# Patient Record
Sex: MALE | Race: BLACK OR AFRICAN AMERICAN | NOT HISPANIC OR LATINO
[De-identification: names, ages, dates, MRNs, and addresses within clinical notes are randomized per-mention and may not be internally consistent; named-entity substitution may affect disease eponyms.]

---

## 2017-09-10 ENCOUNTER — TRANSCRIPTION ENCOUNTER (OUTPATIENT)
Age: 57
End: 2017-09-10

## 2020-03-02 ENCOUNTER — INPATIENT (INPATIENT)
Facility: HOSPITAL | Age: 60
LOS: 6 days | Discharge: HOME | DRG: 286 | End: 2020-03-09
Attending: HOSPITALIST | Admitting: FAMILY MEDICINE
Payer: COMMERCIAL

## 2020-03-02 VITALS
WEIGHT: 197.98 LBS | RESPIRATION RATE: 28 BRPM | HEART RATE: 91 BPM | HEIGHT: 73 IN | DIASTOLIC BLOOD PRESSURE: 85 MMHG | TEMPERATURE: 98 F | SYSTOLIC BLOOD PRESSURE: 163 MMHG | OXYGEN SATURATION: 98 %

## 2020-03-02 DIAGNOSIS — R06.03 ACUTE RESPIRATORY DISTRESS: ICD-10-CM

## 2020-03-02 LAB
ALBUMIN SERPL ELPH-MCNC: 4.1 G/DL — SIGNIFICANT CHANGE UP (ref 3.3–5.2)
ALP SERPL-CCNC: 93 U/L — SIGNIFICANT CHANGE UP (ref 40–120)
ALT FLD-CCNC: 156 U/L — HIGH
ANION GAP SERPL CALC-SCNC: 16 MMOL/L — SIGNIFICANT CHANGE UP (ref 5–17)
APTT BLD: 26.4 SEC — LOW (ref 27.5–36.3)
AST SERPL-CCNC: 98 U/L — HIGH
BILIRUB SERPL-MCNC: 0.9 MG/DL — SIGNIFICANT CHANGE UP (ref 0.4–2)
BUN SERPL-MCNC: 19 MG/DL — SIGNIFICANT CHANGE UP (ref 8–20)
CALCIUM SERPL-MCNC: 8.9 MG/DL — SIGNIFICANT CHANGE UP (ref 8.6–10.2)
CHLORIDE SERPL-SCNC: 103 MMOL/L — SIGNIFICANT CHANGE UP (ref 98–107)
CO2 SERPL-SCNC: 21 MMOL/L — LOW (ref 22–29)
CREAT SERPL-MCNC: 1.11 MG/DL — SIGNIFICANT CHANGE UP (ref 0.5–1.3)
FLU A RESULT: SIGNIFICANT CHANGE UP
FLU A RESULT: SIGNIFICANT CHANGE UP
FLUAV AG NPH QL: SIGNIFICANT CHANGE UP
FLUBV AG NPH QL: SIGNIFICANT CHANGE UP
GLUCOSE BLDC GLUCOMTR-MCNC: 163 MG/DL — HIGH (ref 70–99)
GLUCOSE SERPL-MCNC: 182 MG/DL — HIGH (ref 70–99)
HCT VFR BLD CALC: 46.6 % — SIGNIFICANT CHANGE UP (ref 39–50)
HGB BLD-MCNC: 15.2 G/DL — SIGNIFICANT CHANGE UP (ref 13–17)
INR BLD: 1.09 RATIO — SIGNIFICANT CHANGE UP (ref 0.88–1.16)
MAGNESIUM SERPL-MCNC: 1.8 MG/DL — SIGNIFICANT CHANGE UP (ref 1.6–2.6)
MCHC RBC-ENTMCNC: 29.2 PG — SIGNIFICANT CHANGE UP (ref 27–34)
MCHC RBC-ENTMCNC: 32.6 GM/DL — SIGNIFICANT CHANGE UP (ref 32–36)
MCV RBC AUTO: 89.6 FL — SIGNIFICANT CHANGE UP (ref 80–100)
NT-PROBNP SERPL-SCNC: 260 PG/ML — SIGNIFICANT CHANGE UP (ref 0–300)
PLATELET # BLD AUTO: 239 K/UL — SIGNIFICANT CHANGE UP (ref 150–400)
POTASSIUM SERPL-MCNC: 4.1 MMOL/L — SIGNIFICANT CHANGE UP (ref 3.5–5.3)
POTASSIUM SERPL-SCNC: 4.1 MMOL/L — SIGNIFICANT CHANGE UP (ref 3.5–5.3)
PROT SERPL-MCNC: 7.1 G/DL — SIGNIFICANT CHANGE UP (ref 6.6–8.7)
PROTHROM AB SERPL-ACNC: 12.3 SEC — SIGNIFICANT CHANGE UP (ref 10–12.9)
RBC # BLD: 5.2 M/UL — SIGNIFICANT CHANGE UP (ref 4.2–5.8)
RBC # FLD: 13.5 % — SIGNIFICANT CHANGE UP (ref 10.3–14.5)
RSV RESULT: SIGNIFICANT CHANGE UP
RSV RNA RESP QL NAA+PROBE: SIGNIFICANT CHANGE UP
SODIUM SERPL-SCNC: 140 MMOL/L — SIGNIFICANT CHANGE UP (ref 135–145)
TROPONIN T SERPL-MCNC: <0.01 NG/ML — SIGNIFICANT CHANGE UP (ref 0–0.06)
WBC # BLD: 8.6 K/UL — SIGNIFICANT CHANGE UP (ref 3.8–10.5)
WBC # FLD AUTO: 8.6 K/UL — SIGNIFICANT CHANGE UP (ref 3.8–10.5)

## 2020-03-02 PROCEDURE — 99291 CRITICAL CARE FIRST HOUR: CPT

## 2020-03-02 PROCEDURE — 93010 ELECTROCARDIOGRAM REPORT: CPT

## 2020-03-02 PROCEDURE — 71045 X-RAY EXAM CHEST 1 VIEW: CPT | Mod: 26

## 2020-03-02 RX ORDER — IPRATROPIUM/ALBUTEROL SULFATE 18-103MCG
3 AEROSOL WITH ADAPTER (GRAM) INHALATION EVERY 6 HOURS
Refills: 0 | Status: DISCONTINUED | OUTPATIENT
Start: 2020-03-02 | End: 2020-03-05

## 2020-03-02 RX ORDER — SODIUM CHLORIDE 9 MG/ML
1000 INJECTION, SOLUTION INTRAVENOUS
Refills: 0 | Status: DISCONTINUED | OUTPATIENT
Start: 2020-03-02 | End: 2020-03-09

## 2020-03-02 RX ORDER — ALBUTEROL 90 UG/1
1 AEROSOL, METERED ORAL EVERY 4 HOURS
Refills: 0 | Status: DISCONTINUED | OUTPATIENT
Start: 2020-03-02 | End: 2020-03-09

## 2020-03-02 RX ORDER — INSULIN GLARGINE 100 [IU]/ML
10 INJECTION, SOLUTION SUBCUTANEOUS AT BEDTIME
Refills: 0 | Status: DISCONTINUED | OUTPATIENT
Start: 2020-03-02 | End: 2020-03-07

## 2020-03-02 RX ORDER — IPRATROPIUM/ALBUTEROL SULFATE 18-103MCG
3 AEROSOL WITH ADAPTER (GRAM) INHALATION ONCE
Refills: 0 | Status: COMPLETED | OUTPATIENT
Start: 2020-03-02 | End: 2020-03-02

## 2020-03-02 RX ORDER — ASPIRIN/CALCIUM CARB/MAGNESIUM 324 MG
325 TABLET ORAL ONCE
Refills: 0 | Status: COMPLETED | OUTPATIENT
Start: 2020-03-02 | End: 2020-03-02

## 2020-03-02 RX ORDER — FUROSEMIDE 40 MG
20 TABLET ORAL DAILY
Refills: 0 | Status: DISCONTINUED | OUTPATIENT
Start: 2020-03-03 | End: 2020-03-03

## 2020-03-02 RX ORDER — ENOXAPARIN SODIUM 100 MG/ML
40 INJECTION SUBCUTANEOUS DAILY
Refills: 0 | Status: DISCONTINUED | OUTPATIENT
Start: 2020-03-02 | End: 2020-03-05

## 2020-03-02 RX ORDER — INSULIN LISPRO 100/ML
VIAL (ML) SUBCUTANEOUS
Refills: 0 | Status: DISCONTINUED | OUTPATIENT
Start: 2020-03-02 | End: 2020-03-09

## 2020-03-02 RX ORDER — TIOTROPIUM BROMIDE 18 UG/1
1 CAPSULE ORAL; RESPIRATORY (INHALATION) DAILY
Refills: 0 | Status: DISCONTINUED | OUTPATIENT
Start: 2020-03-02 | End: 2020-03-09

## 2020-03-02 RX ORDER — DEXTROSE 50 % IN WATER 50 %
25 SYRINGE (ML) INTRAVENOUS ONCE
Refills: 0 | Status: DISCONTINUED | OUTPATIENT
Start: 2020-03-02 | End: 2020-03-09

## 2020-03-02 RX ORDER — DEXTROSE 50 % IN WATER 50 %
12.5 SYRINGE (ML) INTRAVENOUS ONCE
Refills: 0 | Status: DISCONTINUED | OUTPATIENT
Start: 2020-03-02 | End: 2020-03-09

## 2020-03-02 RX ORDER — ALBUTEROL 90 UG/1
2.5 AEROSOL, METERED ORAL
Refills: 0 | Status: DISCONTINUED | OUTPATIENT
Start: 2020-03-02 | End: 2020-03-09

## 2020-03-02 RX ORDER — FUROSEMIDE 40 MG
40 TABLET ORAL ONCE
Refills: 0 | Status: COMPLETED | OUTPATIENT
Start: 2020-03-02 | End: 2020-03-02

## 2020-03-02 RX ORDER — LISINOPRIL 2.5 MG/1
10 TABLET ORAL DAILY
Refills: 0 | Status: DISCONTINUED | OUTPATIENT
Start: 2020-03-02 | End: 2020-03-08

## 2020-03-02 RX ORDER — GLUCAGON INJECTION, SOLUTION 0.5 MG/.1ML
1 INJECTION, SOLUTION SUBCUTANEOUS ONCE
Refills: 0 | Status: DISCONTINUED | OUTPATIENT
Start: 2020-03-02 | End: 2020-03-09

## 2020-03-02 RX ORDER — ASPIRIN/CALCIUM CARB/MAGNESIUM 324 MG
81 TABLET ORAL DAILY
Refills: 0 | Status: DISCONTINUED | OUTPATIENT
Start: 2020-03-03 | End: 2020-03-09

## 2020-03-02 RX ORDER — DEXTROSE 50 % IN WATER 50 %
15 SYRINGE (ML) INTRAVENOUS ONCE
Refills: 0 | Status: DISCONTINUED | OUTPATIENT
Start: 2020-03-02 | End: 2020-03-09

## 2020-03-02 RX ORDER — INSULIN LISPRO 100/ML
VIAL (ML) SUBCUTANEOUS AT BEDTIME
Refills: 0 | Status: DISCONTINUED | OUTPATIENT
Start: 2020-03-02 | End: 2020-03-09

## 2020-03-02 RX ORDER — NITROGLYCERIN 6.5 MG
1 CAPSULE, EXTENDED RELEASE ORAL ONCE
Refills: 0 | Status: COMPLETED | OUTPATIENT
Start: 2020-03-02 | End: 2020-03-02

## 2020-03-02 RX ADMIN — INSULIN GLARGINE 10 UNIT(S): 100 INJECTION, SOLUTION SUBCUTANEOUS at 21:47

## 2020-03-02 RX ADMIN — LISINOPRIL 10 MILLIGRAM(S): 2.5 TABLET ORAL at 21:46

## 2020-03-02 RX ADMIN — Medication 3 MILLILITER(S): at 15:58

## 2020-03-02 RX ADMIN — Medication 1 INCH(S): at 15:57

## 2020-03-02 RX ADMIN — Medication 40 MILLIGRAM(S): at 21:46

## 2020-03-02 RX ADMIN — Medication 40 MILLIGRAM(S): at 15:57

## 2020-03-02 RX ADMIN — ENOXAPARIN SODIUM 40 MILLIGRAM(S): 100 INJECTION SUBCUTANEOUS at 21:47

## 2020-03-02 RX ADMIN — Medication 3 MILLILITER(S): at 20:16

## 2020-03-02 NOTE — ED PROVIDER NOTE - OBJECTIVE STATEMENT
59M w/ pmh 59M w/ pmh HTN, HLD -- p/w 59M w/ pmh HTN, HLD -- p/w sob x several days, worse today, assc w/ mild lightheadedness. No fever, n/v/d/c, chest / abd pain, cough, dysuria/hematuria. No recent travel, medication change, illness, or hospitalization. no h/o asthma, inhaler use. former smoker when younger.    Last stress 1 yr ago, longer since last echo.

## 2020-03-02 NOTE — ED ADULT NURSE REASSESSMENT NOTE - NS ED NURSE REASSESS COMMENT FT1
Report received from day RN Pt A&Ox4 at this time. Bipap removed prior and changed to NC @ 2L prior to this RN assuming care of patient. Patient tolerating NC well. breathing unlabored. denies sob or chest pain. lung sounds clear through out. Pt resting comfortably, VSS, no signs of distress at this time, CM in place, awaiting bed for admission. safety maintained, call bell in reach.

## 2020-03-02 NOTE — H&P ADULT - ASSESSMENT
pt. is admitted for     - Dyspnea unspecified type, likely related to his wheeze, bronchitis ? no h/o asthma, clinically not in chf or volume over loaded, respiratory viral panel pending. continue duoneb, o2 support, iv solumedrol. will get echo.     - Essential htn, continue lisinopril and clonidine.     - DM type 2 no long term insulin use with hyperglycemia, will keep on humalog scale and lantus as he will be on steroids at this point.    - JADON , continue pt's cpap.     - ALT/ ast/ elevation noted, no old labs available for comparison, will follow trend and hepatitis panel ordered.

## 2020-03-02 NOTE — ED ADULT NURSE NOTE - OBJECTIVE STATEMENT
pt c/o SOB that started this morning. pt states he would occasionally experience SOB but it would usually go away. however, today the SOB worsened. pt also c/o new onset dry cough. pt denies any chest pain at the moment. pt is a&o x4, breathing is labored.

## 2020-03-02 NOTE — ED ADULT NURSE REASSESSMENT NOTE - NS ED NURSE REASSESS COMMENT FT1
Report received from day RN Pt A&Ox4 at this time. Heparin drip running at 11ml/hr as ordered. denies chest pain or sob at this time. breathing unlabored.  Pt resting comfortably, VSS, no signs of distress at this time, CM in place, Brother at bedside. awaiting bed for admission. safety maintained, call bell in reach.

## 2020-03-02 NOTE — ED PROVIDER NOTE - ATTENDING CONTRIBUTION TO CARE
The patient seen and examined    Acute respiratory distress  COPD vs CHF    I, Ivan De Luna, performed the initial face to face bedside interview with this patient regarding history of present illness, review of symptoms and relevant past medical, social and family history.  I completed an independent physical examination.  I was the initial provider who evaluated this patient. I have signed out the follow up of any pending tests (i.e. labs, radiological studies) to the resident.  I have communicated the patient’s plan of care and disposition with the resident.

## 2020-03-02 NOTE — H&P ADULT - HISTORY OF PRESENT ILLNESS
60 y/o male with h/o htn, dm and sleep apnea and on cpap of 14 as per pt. came to the ER as he started having dry cough sob with cough ,  wheeze in the chest which he experienced for short period yesterday, got better on its own but all symptoms came back this morning and symptoms were way more that yesterday so he decided to come to the hospital. no sick contact. no recent travel. no cp. no fever. no abd. pain. no n/v/d. Upon arival in the ER pt. was put on bipap, iv solumedrol ( was wheezing as per ER physician dr. De Luna ) , iv lasix and at the time of admission pt. feels better, came off the bipap and on o2 via NC and talking in full sentences. 58 y/o male with h/o htn, dm and sleep apnea and on cpap of 14 as per pt. came to the ER as he started having dry cough sob with cough ,  wheeze in the chest which he experienced for short period yesterday, got better on its own but all symptoms came back this morning and symptoms were way more that yesterday so he decided to come to the hospital. no sick contact. no recent travel. no cp. no fever. no abd. pain. no n/v/d. Upon arival in the ER pt. was put on bipap, iv solumedrol ( was wheezing as per ER physician dr. De Luna ) , iv lasix and at the time of admission pt. feels better, came off the bipap and on o2 via NC and talking in full sentences.

## 2020-03-02 NOTE — ED PROVIDER NOTE - PHYSICAL EXAMINATION
*GEN:   mildly uncomfortable, AOx3  *EYES:   pupils equally round and reactive to light, extra-occular movements intact  *HEENT:   airway patent, moist mucosal membranes, full ROM neck  *CV:   regular rate and rhythm  *RESP:   distant breath sounds and mild wheeze bilat  *ABD:   soft, non-tender  *:   no cva/flank tenderness  *EXTREM:   no MSK tenderness, full ROM throughout, no leg swelling  *SKIN:   dry, intact  *NEURO:   AOx3, no focal weakness or loss of sensation

## 2020-03-02 NOTE — H&P ADULT - NSHPPHYSICALEXAM_GEN_ALL_CORE
General: Well developed AA male lying in bed not in distress.   HEENT: AT, NC. PERRL. intact EOM. no throat erythema or exudate.   Neck: supple. no JVD.   Chest:  mild wheeze bilaterally, no rales, no inter costal retractions.   Heart: S1,S2. RRR. no heart murmur. no edema.   Abdomen: soft. non-tender. obese,+ BS.   Ext:  no calf tenderness. ROM of all ext. intact, distal pulses intact.   Neuro: AAO x3. no focal weakness. no speech disorder. CNs intcat. m/r/s intact.  Skin: no rash noted, warm and dry.  psych : normal affect.

## 2020-03-02 NOTE — ED ADULT TRIAGE NOTE - CHIEF COMPLAINT QUOTE
Patient A&Ox4, diaphoretic, complaining of shortness of breath that began last night, worsening throughout the day. Denies any chest pain nausea or dizziness. Patient arrived with CPAP in place by EMS, well tolerated. Respirations labored, Dr. De Luna & RT at bedside, patient placed on BiPap

## 2020-03-02 NOTE — ED PROVIDER NOTE - CLINICAL SUMMARY MEDICAL DECISION MAKING FREE TEXT BOX
59M w/ sob, concern for new CHF vs copd, pna, asthma - will check cardiacs as well, place on bipap, reassess

## 2020-03-03 LAB
ALBUMIN SERPL ELPH-MCNC: 4 G/DL — SIGNIFICANT CHANGE UP (ref 3.3–5.2)
ALP SERPL-CCNC: 72 U/L — SIGNIFICANT CHANGE UP (ref 40–120)
ALT FLD-CCNC: 121 U/L — HIGH
ANION GAP SERPL CALC-SCNC: 13 MMOL/L — SIGNIFICANT CHANGE UP (ref 5–17)
AST SERPL-CCNC: 44 U/L — HIGH
BASOPHILS # BLD AUTO: 0.01 K/UL — SIGNIFICANT CHANGE UP (ref 0–0.2)
BASOPHILS NFR BLD AUTO: 0.1 % — SIGNIFICANT CHANGE UP (ref 0–2)
BILIRUB SERPL-MCNC: 1.1 MG/DL — SIGNIFICANT CHANGE UP (ref 0.4–2)
BUN SERPL-MCNC: 19 MG/DL — SIGNIFICANT CHANGE UP (ref 8–20)
CALCIUM SERPL-MCNC: 9.1 MG/DL — SIGNIFICANT CHANGE UP (ref 8.6–10.2)
CHLORIDE SERPL-SCNC: 103 MMOL/L — SIGNIFICANT CHANGE UP (ref 98–107)
CHOLEST SERPL-MCNC: 118 MG/DL — SIGNIFICANT CHANGE UP (ref 110–199)
CO2 SERPL-SCNC: 24 MMOL/L — SIGNIFICANT CHANGE UP (ref 22–29)
CREAT SERPL-MCNC: 1.14 MG/DL — SIGNIFICANT CHANGE UP (ref 0.5–1.3)
EOSINOPHIL # BLD AUTO: 0 K/UL — SIGNIFICANT CHANGE UP (ref 0–0.5)
EOSINOPHIL NFR BLD AUTO: 0 % — SIGNIFICANT CHANGE UP (ref 0–6)
GLUCOSE BLDC GLUCOMTR-MCNC: 195 MG/DL — HIGH (ref 70–99)
GLUCOSE BLDC GLUCOMTR-MCNC: 221 MG/DL — HIGH (ref 70–99)
GLUCOSE BLDC GLUCOMTR-MCNC: 226 MG/DL — HIGH (ref 70–99)
GLUCOSE BLDC GLUCOMTR-MCNC: 274 MG/DL — HIGH (ref 70–99)
GLUCOSE SERPL-MCNC: 215 MG/DL — HIGH (ref 70–99)
HAV IGM SER-ACNC: SIGNIFICANT CHANGE UP
HBA1C BLD-MCNC: 8.6 % — HIGH (ref 4–5.6)
HBV CORE IGM SER-ACNC: SIGNIFICANT CHANGE UP
HBV SURFACE AG SER-ACNC: SIGNIFICANT CHANGE UP
HCT VFR BLD CALC: 44.6 % — SIGNIFICANT CHANGE UP (ref 39–50)
HCV AB S/CO SERPL IA: 0.28 S/CO — SIGNIFICANT CHANGE UP (ref 0–0.99)
HCV AB SERPL-IMP: SIGNIFICANT CHANGE UP
HDLC SERPL-MCNC: 49 MG/DL — SIGNIFICANT CHANGE UP
HGB BLD-MCNC: 14.5 G/DL — SIGNIFICANT CHANGE UP (ref 13–17)
IMM GRANULOCYTES NFR BLD AUTO: 0.4 % — SIGNIFICANT CHANGE UP (ref 0–1.5)
LIPID PNL WITH DIRECT LDL SERPL: 49 MG/DL — SIGNIFICANT CHANGE UP
LYMPHOCYTES # BLD AUTO: 0.99 K/UL — LOW (ref 1–3.3)
LYMPHOCYTES # BLD AUTO: 12.2 % — LOW (ref 13–44)
MCHC RBC-ENTMCNC: 29.1 PG — SIGNIFICANT CHANGE UP (ref 27–34)
MCHC RBC-ENTMCNC: 32.5 GM/DL — SIGNIFICANT CHANGE UP (ref 32–36)
MCV RBC AUTO: 89.4 FL — SIGNIFICANT CHANGE UP (ref 80–100)
MONOCYTES # BLD AUTO: 0.16 K/UL — SIGNIFICANT CHANGE UP (ref 0–0.9)
MONOCYTES NFR BLD AUTO: 2 % — SIGNIFICANT CHANGE UP (ref 2–14)
NEUTROPHILS # BLD AUTO: 6.94 K/UL — SIGNIFICANT CHANGE UP (ref 1.8–7.4)
NEUTROPHILS NFR BLD AUTO: 85.3 % — HIGH (ref 43–77)
PLATELET # BLD AUTO: 237 K/UL — SIGNIFICANT CHANGE UP (ref 150–400)
POTASSIUM SERPL-MCNC: 4.6 MMOL/L — SIGNIFICANT CHANGE UP (ref 3.5–5.3)
POTASSIUM SERPL-SCNC: 4.6 MMOL/L — SIGNIFICANT CHANGE UP (ref 3.5–5.3)
PROT SERPL-MCNC: 6.9 G/DL — SIGNIFICANT CHANGE UP (ref 6.6–8.7)
RBC # BLD: 4.99 M/UL — SIGNIFICANT CHANGE UP (ref 4.2–5.8)
RBC # FLD: 13.7 % — SIGNIFICANT CHANGE UP (ref 10.3–14.5)
SODIUM SERPL-SCNC: 140 MMOL/L — SIGNIFICANT CHANGE UP (ref 135–145)
TOTAL CHOLESTEROL/HDL RATIO MEASUREMENT: 2 RATIO — LOW (ref 3.4–9.6)
TRIGL SERPL-MCNC: 99 MG/DL — SIGNIFICANT CHANGE UP (ref 10–200)
TROPONIN T SERPL-MCNC: <0.01 NG/ML — SIGNIFICANT CHANGE UP (ref 0–0.06)
WBC # BLD: 8.13 K/UL — SIGNIFICANT CHANGE UP (ref 3.8–10.5)
WBC # FLD AUTO: 8.13 K/UL — SIGNIFICANT CHANGE UP (ref 3.8–10.5)

## 2020-03-03 PROCEDURE — 99233 SBSQ HOSP IP/OBS HIGH 50: CPT

## 2020-03-03 PROCEDURE — 93306 TTE W/DOPPLER COMPLETE: CPT | Mod: 26

## 2020-03-03 RX ORDER — INFLUENZA VIRUS VACCINE 15; 15; 15; 15 UG/.5ML; UG/.5ML; UG/.5ML; UG/.5ML
0.5 SUSPENSION INTRAMUSCULAR ONCE
Refills: 0 | Status: DISCONTINUED | OUTPATIENT
Start: 2020-03-03 | End: 2020-03-09

## 2020-03-03 RX ORDER — INSULIN LISPRO 100/ML
4 VIAL (ML) SUBCUTANEOUS
Refills: 0 | Status: DISCONTINUED | OUTPATIENT
Start: 2020-03-03 | End: 2020-03-09

## 2020-03-03 RX ORDER — FUROSEMIDE 40 MG
40 TABLET ORAL ONCE
Refills: 0 | Status: COMPLETED | OUTPATIENT
Start: 2020-03-03 | End: 2020-03-03

## 2020-03-03 RX ADMIN — Medication 40 MILLIGRAM(S): at 05:12

## 2020-03-03 RX ADMIN — Medication 3 MILLILITER(S): at 03:23

## 2020-03-03 RX ADMIN — Medication 2: at 15:53

## 2020-03-03 RX ADMIN — INSULIN GLARGINE 10 UNIT(S): 100 INJECTION, SOLUTION SUBCUTANEOUS at 22:40

## 2020-03-03 RX ADMIN — Medication 40 MILLIGRAM(S): at 17:28

## 2020-03-03 RX ADMIN — Medication 0.1 MILLIGRAM(S): at 05:12

## 2020-03-03 RX ADMIN — Medication 4: at 11:47

## 2020-03-03 RX ADMIN — Medication 3 MILLILITER(S): at 21:46

## 2020-03-03 RX ADMIN — Medication 3 MILLILITER(S): at 08:29

## 2020-03-03 RX ADMIN — Medication 3 MILLILITER(S): at 14:56

## 2020-03-03 RX ADMIN — ENOXAPARIN SODIUM 40 MILLIGRAM(S): 100 INJECTION SUBCUTANEOUS at 11:48

## 2020-03-03 RX ADMIN — Medication 20 MILLIGRAM(S): at 05:12

## 2020-03-03 RX ADMIN — Medication 40 MILLIGRAM(S): at 17:29

## 2020-03-03 RX ADMIN — Medication 40 MILLIGRAM(S): at 15:53

## 2020-03-03 RX ADMIN — Medication 81 MILLIGRAM(S): at 11:48

## 2020-03-03 RX ADMIN — Medication 2: at 22:40

## 2020-03-03 RX ADMIN — Medication 4: at 08:05

## 2020-03-03 RX ADMIN — Medication 1 INCH(S): at 04:23

## 2020-03-03 NOTE — CONSULT NOTE ADULT - SUBJECTIVE AND OBJECTIVE BOX
[place] [facility]                                                               Office:  [address] [address]                                                              Telephone: [phone]. Fax:[phone]                                                                        CARDIOLOGY CONSULTATION NOTE                                                                                             Consult requested by:  Dr. Suarez  Reason for Consultation:  Cardiomyopathy  History obtained by: Patient and medical record   obtained: No    Chief complaint:    Patient is a 59y old  Male who presents with a chief complaint of cough/ wheeze (03 Mar 2020 16:30)        HPI: Pt is a 58 y/o male with medical history of sleep apnea on CPAP, HTN, HLD, who presents to [facility]ED for SOB. Pt states that since Monday 5:30 AM he has been   58 y/o male with h/o htn, dm and sleep apnea and on cpap of 14 as per pt. came to the ER as he started having dry cough sob with cough ,  wheeze in the chest which he experienced for short period yesterday, got better on its own but all symptoms came back this morning and symptoms were way more that yesterday so he decided to come to the hospital. no sick contact. no recent travel. no cp. no fever. no abd. pain. no n/v/d. Upon arival in the ER pt. was put on bipap, iv solumedrol ( was wheezing as per ER physician dr. De Luna ) , iv lasix and at the time of admission pt. feels better, came off the bipap and on o2 via NC and talking in full sentences. (02 Mar 2020 18:26)        REVIEW OF SYMPTOMS:     CONSTITUTIONAL: No fever, weight loss, or fatigue  ENMT:  No difficulty hearing, tinnitus, vertigo; No sinus or throat pain  NECK: No pain or stiffness  CARDIOVASCULAR: No chest pain, dyspnea, syncope, palpitations, dizziness, Orthopnea, Paroxsymal nocturnal dyspnea  RESPIRATORY: No Dyspnea on exertion, Shortness of breath, cough, wheezing  : No dysuria, no hematuria   GI: No dark color stool, no melena, no diarrhea, no constipation, no abdominal pain   NEURO: No headache, no dizziness, no slurred speech   MUSCULOSKELETAL: No joint pain or swelling; No muscle, back, or extremity pain  PSYCH: No agitation, no anxiety.    ALL OTHER REVIEW OF SYSTEMS ARE NEGATIVE.      PREVIOUS DIAGNOSTIC TESTING  ECHO FINDINGS:      STRESS FINDINGS:      CATHETERIZATION FINDINGS:         ALLERGIES: Allergies    No Known Allergies    Intolerances          PAST MEDICAL HISTORY  Obstructive sleep apnea  DM (diabetes mellitus)  HTN (hypertension)      PAST SURGICAL HISTORY  No significant past surgical history      FAMILY HISTORY:  Family history of diabetes mellitus (DM): parents      SOCIAL HISTORY:  Denies smoking/alcohol/drugs  CIGARETTES:     ALCOHOL:  DRUGS:      CURRENT MEDICATIONS:  cloNIDine 0.1 milliGRAM(s) Oral daily  lisinopril 10 milliGRAM(s) Oral daily    ALBUTerol    90 MICROgram(s) HFA Inhaler  albuterol/ipratropium for Nebulization  tiotropium 18 MICROgram(s) Capsule   aspirin enteric coated  dextrose 5%.  dextrose 50% Injectable  dextrose 50% Injectable  dextrose 50% Injectable  enoxaparin Injectable  influenza   Vaccine  insulin glargine Injectable (LANTUS)  insulin lispro (HumaLOG) corrective regimen sliding scale  insulin lispro (HumaLOG) corrective regimen sliding scale  insulin lispro Injectable (HumaLOG)  methylPREDNISolone sodium succinate Injectable        HOME MEDICATIONS:      Vital Signs Last 24 Hrs  T(C): 36.7 (03 Mar 2020 15:34), Max: 36.9 (02 Mar 2020 19:51)  T(F): 98.1 (03 Mar 2020 15:34), Max: 98.5 (03 Mar 2020 03:53)  HR: 87 (03 Mar 2020 15:34) (68 - 88)  BP: 147/90 (03 Mar 2020 15:34) (118/78 - 164/88)  BP(mean): --  RR: 20 (03 Mar 2020 15:34) (18 - 20)  SpO2: 99% (03 Mar 2020 15:34) (97% - 99%)      PHYSICAL EXAM:  Constitutional: Comfortable . No acute distress.   HEENT: Atraumatic and normocephalic , neck is supple . no JVD. No carotid bruit. PEERL   CNS: A&Ox3. No focal deficits. EOMI. Cranial nerves II-IX are intact.   Lymph Nodes: Cervical : Not palpable.  Respiratory: CTAB  Cardiovascular: S1S2 RRR. No murmur/rubs or gallop.  Gastrointestinal: Soft non-tender and non distended . +Bowel sounds. negative Saxena's sign.  Extremities: No edema.   Psychiatric: Calm . no agitation.  Skin: No skin rash/ulcers visualized to face, hands or feet.    Intake and output:     LABS:                        14.5   8.13  )-----------( 237      ( 03 Mar 2020 07:24 )             44.6     03-03    140  |  103  |  19.0  ----------------------------<  215<H>  4.6   |  24.0  |  1.14    Ca    9.1      03 Mar 2020 07:24  Mg     1.8     03-02    TPro  6.9  /  Alb  4.0  /  TBili  1.1  /  DBili  x   /  AST  44<H>  /  ALT  121<H>  /  AlkPhos  72  03-03    CARDIAC MARKERS ( 03 Mar 2020 07:23 )  x     / <0.01 ng/mL / x     / x     / x      CARDIAC MARKERS ( 02 Mar 2020 16:26 )  x     / <0.01 ng/mL / x     / x     / x        ;p-BNP=Serum Pro-Brain Natriuretic Peptide: 260 pg/mL (03-02 @ 16:26)    PT/INR - ( 02 Mar 2020 19:34 )   PT: 12.3 sec;   INR: 1.09 ratio         PTT - ( 02 Mar 2020 19:34 )  PTT:26.4 sec      INTERPRETATION OF TELEMETRY: Reviewed by me.   ECG: Reviewed by me.     RADIOLOGY & ADDITIONAL STUDIES:    X-ray:  reviewed by me.   CT scan:   MRI: [place] [facility]                                                               Office:  [address] [address]                                                              Telephone: [phone]. Fax:[phone]                                                                        CARDIOLOGY CONSULTATION NOTE                                                                                             Consult requested by:  Dr. Suarez  Reason for Consultation:  Cardiomyopathy  History obtained by: Patient and medical record   obtained: No    Chief complaint:    Patient is a 59y old  Male who presents with a chief complaint of cough/ wheeze (03 Mar 2020 16:30)        HPI: Pt is a 60 y/o male with medical history of sleep apnea on CPAP, HTN, HLD, who presents to [facility]ED for SOB. Pt states that since Monday 5:30 AM he has been feeling SOB and coughing which got progressively worse. Pt came to [facility]-ED for workup. Pt BNP-260, Tropx2- negative, Xray- cardiomegaly with clear lungs,        60 y/o male with h/o htn, dm and sleep apnea and on cpap of 14 as per pt. came to the ER as he started having dry cough sob with cough ,  wheeze in the chest which he experienced for short period yesterday, got better on its own but all symptoms came back this morning and symptoms were way more that yesterday so he decided to come to the hospital. no sick contact. no recent travel. no cp. no fever. no abd. pain. no n/v/d. Upon arival in the ER pt. was put on bipap, iv solumedrol ( was wheezing as per ER physician dr. De Luna ) , iv lasix and at the time of admission pt. feels better, came off the bipap and on o2 via NC and talking in full sentences. (02 Mar 2020 18:26)        REVIEW OF SYMPTOMS:     CONSTITUTIONAL: No fever, weight loss, or fatigue  ENMT:  No difficulty hearing, tinnitus, vertigo; No sinus or throat pain  NECK: No pain or stiffness  CARDIOVASCULAR: See HPI  RESPIRATORY: No Dyspnea on exertion, Shortness of breath, cough, wheezing  : No dysuria, no hematuria   GI: No dark color stool, no melena, no diarrhea, no constipation, no abdominal pain   NEURO: No headache, no dizziness, no slurred speech   MUSCULOSKELETAL: No joint pain or swelling; No muscle, back, or extremity pain  PSYCH: No agitation, no anxiety.    ALL OTHER REVIEW OF SYSTEMS ARE NEGATIVE.      PREVIOUS DIAGNOSTIC TESTING  ECHO FINDINGS:< from: TTE Echo Complete w/o contrast w/ Doppler (03.03.20 @ 09:39) >  Summary:   1. Endocardial visualization was enhanced with intravenous echo contrast.   2. Left ventricular ejection fraction, by visual estimation, is 40 to 45%.   3. Moderately decreased global left ventricular systolic function.   4. The left ventricular diastolic function could not be assessed in this study.   5. Normal left ventricular internal cavity size.   6. There is mild concentric left ventricular hypertrophy.   7. Severely enlarged left atrium.   8. Mildly enlarged right atrium.   9. Mild mitral annular calcification.  10. Mild thickening of the anterior and posterior mitral valve leaflets.  11. Mild to moderate mitral valve regurgitation.  12. Sclerotic aortic valve with normal opening.  13. Moderate aortic regurgitation.  14. There is no evidence of pericardial effusion.    < end of copied text >        ALLERGIES: Allergies    No Known Allergies    Intolerances          PAST MEDICAL HISTORY  Obstructive sleep apnea  DM (diabetes mellitus)  HTN (hypertension)      PAST SURGICAL HISTORY  No significant past surgical history      FAMILY HISTORY:  Family history of diabetes mellitus (DM): parents  MI- brother age 49, father age unknown  DVT-brother age 55    SOCIAL HISTORY:    CIGARETTES: quit @ age 20     ALCOHOL: ocasionally-vodka 8oz  DRUGS: Denies      CURRENT MEDICATIONS:  cloNIDine 0.1 milliGRAM(s) Oral daily  lisinopril 10 milliGRAM(s) Oral daily  ALBUTerol    90 MICROgram(s) HFA Inhaler  albuterol/ipratropium for Nebulization  tiotropium 18 MICROgram(s) Capsule   aspirin enteric coated  dextrose 5%.  dextrose 50% Injectable  dextrose 50% Injectable  dextrose 50% Injectable  enoxaparin Injectable  influenza   Vaccine  insulin glargine Injectable (LANTUS)  insulin lispro (HumaLOG) corrective regimen sliding scale  insulin lispro (HumaLOG) corrective regimen sliding scale  insulin lispro Injectable (HumaLOG)  methylPREDNISolone sodium succinate Injectable        HOME MEDICATIONS:      Vital Signs Last 24 Hrs  T(C): 36.7 (03 Mar 2020 15:34), Max: 36.9 (02 Mar 2020 19:51)  T(F): 98.1 (03 Mar 2020 15:34), Max: 98.5 (03 Mar 2020 03:53)  HR: 87 (03 Mar 2020 15:34) (68 - 88)  BP: 147/90 (03 Mar 2020 15:34) (118/78 - 164/88)  RR: 20 (03 Mar 2020 15:34) (18 - 20)  SpO2: 99% (03 Mar 2020 15:34) (97% - 99%)      PHYSICAL EXAM:  Constitutional: Comfortable . No acute distress.   HEENT: Atraumatic and normocephalic , neck is supple . no JVD. No carotid bruit. PEERL   CNS: A&Ox3. No focal deficits. EOMI.   Lymph Nodes: Cervical : Not palpable.  Respiratory: CTAB  Cardiovascular: S1S2 RRR. No murmur/rubs or gallop.  Gastrointestinal: Soft non-tender and non distended . +Bowel sounds. negative Saxena's sign.  Extremities: No edema.   Psychiatric: Calm . no agitation.  Skin: No skin rash/ulcers visualized to face, hands or feet.    Intake and output:     LABS:                        14.5   8.13  )-----------( 237      ( 03 Mar 2020 07:24 )             44.6     03-03    140  |  103  |  19.0  ----------------------------<  215<H>  4.6   |  24.0  |  1.14    Ca    9.1      03 Mar 2020 07:24  Mg     1.8     03-02    TPro  6.9  /  Alb  4.0  /  TBili  1.1  /  DBili  x   /  AST  44<H>  /  ALT  121<H>  /  AlkPhos  72  03-03    CARDIAC MARKERS ( 03 Mar 2020 07:23 )  x     / <0.01 ng/mL / x     / x     / x      CARDIAC MARKERS ( 02 Mar 2020 16:26 )  x     / <0.01 ng/mL / x     / x     / x        ;p-BNP=Serum Pro-Brain Natriuretic Peptide: 260 pg/mL (03-02 @ 16:26)    PT/INR - ( 02 Mar 2020 19:34 )   PT: 12.3 sec;   INR: 1.09 ratio         PTT - ( 02 Mar 2020 19:34 )  PTT:26.4 sec      INTERPRETATION OF TELEMETRY: NSR HR @ 85   ECG: NSR HR @ 81 ocasional PVC    RADIOLOGY & ADDITIONAL STUDIES:    X-ray:  < from: Xray Chest 1 View-PORTABLE IMMEDIATE (03.02.20 @ 16:15) >  IMPRESSION: Cardiomegaly with clear lungs.    < end of copied text > [place] [facility]                                                               Office:  [address] [place] [address]                                                              Telephone: [phone]. Fax:[phone]                                                                        CARDIOLOGY CONSULTATION NOTE                                                                                             Consult requested by:  Dr. Suarez  Reason for Consultation:  Cardiomyopathy  History obtained by: Patient and medical record   obtained: No    Chief complaint:    Patient is a 59y old  Male who presents with a chief complaint of cough/ wheeze (03 Mar 2020 16:30)        HPI: Pt is a 60 y/o male with medical history of sleep apnea on CPAP, HTN, HLD, who presents to [facility]-ED for SOB. Pt states that since Monday 5:30 AM he has been feeling SOB and coughing which got progressively worse. Pt came to [facility]-ED for workup. Pt BNP-260, Tropx2- negative, Xray- cardiomegaly with clear lungs, Echo-reduced LV sys. fx. EF 40-45%, mild to mod. MR, mod. AR. Pt Denies fever, chills, PND, edema, chest pain, pressure, palpitations, irregular, fast heart beat, nausea, vomiting, melena, rectal bleed, hematuria, lightheadedness, dizziness, syncope, near syncope. Pt has c/o of distended abd. and increased weight approx 3 lbs within past couple of days.           REVIEW OF SYMPTOMS:     CONSTITUTIONAL: No fever, weight loss, or fatigue  ENMT:  No difficulty hearing, tinnitus, vertigo; No sinus or throat pain  NECK: No pain or stiffness  CARDIOVASCULAR: See HPI  RESPIRATORY: No Dyspnea on exertion, Shortness of breath, cough, wheezing  : No dysuria, no hematuria   GI: No dark color stool, no melena, no diarrhea, no constipation, no abdominal pain   NEURO: No headache, no dizziness, no slurred speech   MUSCULOSKELETAL: No joint pain or swelling; No muscle, back, or extremity pain  PSYCH: No agitation, no anxiety.    ALL OTHER REVIEW OF SYSTEMS ARE NEGATIVE.      PREVIOUS DIAGNOSTIC TESTING  ECHO FINDINGS:< from: TTE Echo Complete w/o contrast w/ Doppler (03.03.20 @ 09:39) >  Summary:   1. Endocardial visualization was enhanced with intravenous echo contrast.   2. Left ventricular ejection fraction, by visual estimation, is 40 to 45%.   3. Moderately decreased global left ventricular systolic function.   4. The left ventricular diastolic function could not be assessed in this study.   5. Normal left ventricular internal cavity size.   6. There is mild concentric left ventricular hypertrophy.   7. Severely enlarged left atrium.   8. Mildly enlarged right atrium.   9. Mild mitral annular calcification.  10. Mild thickening of the anterior and posterior mitral valve leaflets.  11. Mild to moderate mitral valve regurgitation.  12. Sclerotic aortic valve with normal opening.  13. Moderate aortic regurgitation.  14. There is no evidence of pericardial effusion.    < end of copied text >        ALLERGIES: Allergies    No Known Allergies    Intolerances          PAST MEDICAL HISTORY  Obstructive sleep apnea  DM (diabetes mellitus)  HTN (hypertension)      PAST SURGICAL HISTORY  No significant past surgical history      FAMILY HISTORY:  Family history of diabetes mellitus (DM): parents  MI- brother age 49, father age unknown  DVT-brother age 55    SOCIAL HISTORY:    CIGARETTES: quit @ age 20     ALCOHOL: ocasionally-vodka 8oz  DRUGS: Denies      CURRENT MEDICATIONS:  cloNIDine 0.1 milliGRAM(s) Oral daily  lisinopril 10 milliGRAM(s) Oral daily  ALBUTerol    90 MICROgram(s) HFA Inhaler  albuterol/ipratropium for Nebulization  tiotropium 18 MICROgram(s) Capsule   aspirin enteric coated  dextrose 5%.  dextrose 50% Injectable  dextrose 50% Injectable  dextrose 50% Injectable  enoxaparin Injectable  influenza   Vaccine  insulin glargine Injectable (LANTUS)  insulin lispro (HumaLOG) corrective regimen sliding scale  insulin lispro (HumaLOG) corrective regimen sliding scale  insulin lispro Injectable (HumaLOG)  methylPREDNISolone sodium succinate Injectable        HOME MEDICATIONS:    cloNIDine:  (02 Mar 2020 19:53)  Lasix:  (02 Mar 2020 19:53)  Lasix:  (02 Mar 2020 19:58)  lisinopril:  (02 Mar 2020 19:52)  metFORMIN:  (02 Mar 2020 19:53)  Trulicity Pen:  (02 Mar 2020 20:17)      Vital Signs Last 24 Hrs  T(C): 36.7 (03 Mar 2020 15:34), Max: 36.9 (02 Mar 2020 19:51)  T(F): 98.1 (03 Mar 2020 15:34), Max: 98.5 (03 Mar 2020 03:53)  HR: 87 (03 Mar 2020 15:34) (68 - 88)  BP: 147/90 (03 Mar 2020 15:34) (118/78 - 164/88)  RR: 20 (03 Mar 2020 15:34) (18 - 20)  SpO2: 99% (03 Mar 2020 15:34) (97% - 99%)      PHYSICAL EXAM:  Constitutional: Comfortable . No acute distress.   HEENT: Atraumatic and normocephalic , neck is supple . no JVD. No carotid bruit. PEERL   CNS: A&Ox3. No focal deficits. EOMI.   Lymph Nodes: Cervical : Not palpable.  Respiratory: CTAB  Cardiovascular: S1S2 RRR. No murmur/rubs or gallop.  Gastrointestinal: Soft non-tender and +distended . +Bowel sounds. negative Saxena's sign.  Extremities: No edema.   Psychiatric: Calm . no agitation.  Skin: No skin rash/ulcers visualized to face, hands or feet.    Intake and output:     LABS:                        14.5   8.13  )-----------( 237      ( 03 Mar 2020 07:24 )             44.6     03-03    140  |  103  |  19.0  ----------------------------<  215<H>  4.6   |  24.0  |  1.14    Ca    9.1      03 Mar 2020 07:24  Mg     1.8     03-02    TPro  6.9  /  Alb  4.0  /  TBili  1.1  /  DBili  x   /  AST  44<H>  /  ALT  121<H>  /  AlkPhos  72  03-03    CARDIAC MARKERS ( 03 Mar 2020 07:23 )  x     / <0.01 ng/mL / x     / x     / x      CARDIAC MARKERS ( 02 Mar 2020 16:26 )  x     / <0.01 ng/mL / x     / x     / x        ;p-BNP=Serum Pro-Brain Natriuretic Peptide: 260 pg/mL (03-02 @ 16:26)    PT/INR - ( 02 Mar 2020 19:34 )   PT: 12.3 sec;   INR: 1.09 ratio         PTT - ( 02 Mar 2020 19:34 )  PTT:26.4 sec      INTERPRETATION OF TELEMETRY: NSR HR @ 85   ECG: NSR HR @ 81 ocasional PVC    RADIOLOGY & ADDITIONAL STUDIES:    X-ray:  < from: Xray Chest 1 View-PORTABLE IMMEDIATE (03.02.20 @ 16:15) >  IMPRESSION: Cardiomegaly with clear lungs.    < end of copied text >

## 2020-03-03 NOTE — CONSULT NOTE ADULT - ATTENDING COMMENTS
60 y/o male with PMH of JADON on CPAP, HTN, HLP , family history of CAD presenting with progressive SOB to be at rest , no orthopnea  +weight gain   clinical exam showed mild basilar rales and +S3   CXR showed cardiomegaly and pulmonary congestion      Given lasix 40 mg IV   Echo showed EF 45% , with moderate AI , mild to moderate MR  and enlarged LA   for [facility] tomorrow to evaluate for ischemia as etiology of cardiomyopathy

## 2020-03-03 NOTE — PROGRESS NOTE ADULT - SUBJECTIVE AND OBJECTIVE BOX
CC: SOB    INTERVAL HPI/OVERNIGHT EVENTS: Patient seen and examined, complaints of dyspnea at rest and exertion.       Vital Signs Last 24 Hrs  T(C): 36.7 (03 Mar 2020 15:34), Max: 36.9 (02 Mar 2020 19:51)  T(F): 98.1 (03 Mar 2020 15:34), Max: 98.5 (03 Mar 2020 03:53)  HR: 87 (03 Mar 2020 15:34) (65 - 88)  BP: 147/90 (03 Mar 2020 15:34) (118/78 - 164/88)  BP(mean): --  RR: 20 (03 Mar 2020 15:34) (18 - 20)  SpO2: 99% (03 Mar 2020 15:34) (97% - 99%)    PHYSICAL EXAM:    GENERAL: NAD, AOX3 obese  HEAD:  Atraumatic, Normocephalic  EYES: EOMI, PERRLA, conjunctiva and sclera clear  ENMT: Moist mucous membranes  CHEST/LUNG: Clear to auscultation bilaterally; No rales, rhonchi, wheezing, or rubs  HEART: Regular rate and rhythm; No murmurs, rubs, or gallops  ABDOMEN: Soft, Nontender, Nondistended; Bowel sounds present  EXTREMITIES:  2+ Peripheral Pulses, No clubbing, cyanosis, trace pedal edema bilaterally         MEDICATIONS  (STANDING):  ALBUTerol    90 MICROgram(s) HFA Inhaler 1 Puff(s) Inhalation every 4 hours  albuterol/ipratropium for Nebulization 3 milliLiter(s) Nebulizer every 6 hours  aspirin enteric coated 81 milliGRAM(s) Oral daily  cloNIDine 0.1 milliGRAM(s) Oral daily  dextrose 5%. 1000 milliLiter(s) (50 mL/Hr) IV Continuous <Continuous>  dextrose 50% Injectable 12.5 Gram(s) IV Push once  dextrose 50% Injectable 25 Gram(s) IV Push once  dextrose 50% Injectable 25 Gram(s) IV Push once  enoxaparin Injectable 40 milliGRAM(s) SubCutaneous daily  furosemide   Injectable 40 milliGRAM(s) IV Push once  influenza   Vaccine 0.5 milliLiter(s) IntraMuscular once  insulin glargine Injectable (LANTUS) 10 Unit(s) SubCutaneous at bedtime  insulin lispro (HumaLOG) corrective regimen sliding scale   SubCutaneous three times a day before meals  insulin lispro (HumaLOG) corrective regimen sliding scale   SubCutaneous at bedtime  insulin lispro Injectable (HumaLOG) 4 Unit(s) SubCutaneous three times a day before meals  lisinopril 10 milliGRAM(s) Oral daily  methylPREDNISolone sodium succinate Injectable 40 milliGRAM(s) IV Push every 12 hours  tiotropium 18 MICROgram(s) Capsule 1 Capsule(s) Inhalation daily    MEDICATIONS  (PRN):  ALBUTerol    0.083% 2.5 milliGRAM(s) Nebulizer every 2 hours PRN Shortness of Breath and/or Wheezing  dextrose 40% Gel 15 Gram(s) Oral once PRN Blood Glucose LESS THAN 70 milliGRAM(s)/deciliter  glucagon  Injectable 1 milliGRAM(s) IntraMuscular once PRN Glucose LESS THAN 70 milligrams/deciliter  guaiFENesin   Syrup  (Sugar-Free) 200 milliGRAM(s) Oral every 6 hours PRN Cough      Allergies    No Known Allergies    Intolerances          LABS:                          14.5   8.13  )-----------( 237      ( 03 Mar 2020 07:24 )             44.6     03-03    140  |  103  |  19.0  ----------------------------<  215<H>  4.6   |  24.0  |  1.14    Ca    9.1      03 Mar 2020 07:24  Mg     1.8     03-02    TPro  6.9  /  Alb  4.0  /  TBili  1.1  /  DBili  x   /  AST  44<H>  /  ALT  121<H>  /  AlkPhos  72  03-03    PT/INR - ( 02 Mar 2020 19:34 )   PT: 12.3 sec;   INR: 1.09 ratio         PTT - ( 02 Mar 2020 19:34 )  PTT:26.4 sec      RADIOLOGY & ADDITIONAL TESTS:

## 2020-03-03 NOTE — PROGRESS NOTE ADULT - ASSESSMENT
The patient is a 59 year old male with a history of diabetes mellitus type 2, hypertension, JADON on CPAP and a family history of CAD who presented to the Er with complaints of dry cough, shortness of breath and difficulty breathing. Admitted to the ER for acute hypoxic respiratory failure initially requiring BIpap improved with IV lasix and solumedrol transitioned to nasal cannula. Chest xray showed cardiomegaly with clear lungs, BNP was 200 and cardiac enzymes were negative as well as influenza panel.     Assessment/Plan:    1 Acute hypoxic respiratory failure: Unclear etiology  Continue IV lasix  Echocardiogram with EF of 40-45%; cardiology evaluation requested  Wean IV steroids- patient is a non smoker with no wheezing on examination    2. JADON on nocturnal cpap    3. Diabetes mellitus type 2; Continue lantus  Add humalog 4 units TID with meals    4. Hypertension on lisinopril    VTE- lovenox subcut

## 2020-03-03 NOTE — CONSULT NOTE ADULT - ASSESSMENT
Pt is a 58 y/o male with medical history of sleep apnea on CPAP, HTN, HLD, who presents to [facility]ED for SOB. Pt states that since Monday 5:30 AM he has been feeling SOB and coughing which got progressively worse. Pt came to [facility]-ED for workup. Pt BNP-260, Tropx2- negative, Xray- cardiomegaly with clear lungs, Echo-reduced LV sys. fx. EF 40-45%, mild to mod. MR, mod. AR. Pt Denies fever, chills, PND, edema, chest pain, pressure, palpitations, irregular, fast heart beat, nausea, vomiting, melena, rectal bleed, hematuria, lightheadedness, dizziness, syncope, near syncope. Pt has c/o of distended abd. and increased weight approx 3 lbs within past couple of days.    Acute CHF  -ECG- NSR HR @ 81 occcasional PVC  -BNP-260  -Xray- Cardiomegaly with clear lungs  -Tropx2-negative  -Echo-reduced LV sys. fx. EF 40-45%, mild to mod. MR, mod. AR  -Plan for cath in AM  -Start diuresis Lasix IVP 40mg daily

## 2020-03-04 LAB
ANION GAP SERPL CALC-SCNC: 14 MMOL/L — SIGNIFICANT CHANGE UP (ref 5–17)
APPEARANCE UR: CLEAR — SIGNIFICANT CHANGE UP
BACTERIA # UR AUTO: NEGATIVE — SIGNIFICANT CHANGE UP
BILIRUB UR-MCNC: NEGATIVE — SIGNIFICANT CHANGE UP
BUN SERPL-MCNC: 25 MG/DL — HIGH (ref 8–20)
CALCIUM SERPL-MCNC: 9.3 MG/DL — SIGNIFICANT CHANGE UP (ref 8.6–10.2)
CHLORIDE SERPL-SCNC: 99 MMOL/L — SIGNIFICANT CHANGE UP (ref 98–107)
CO2 SERPL-SCNC: 26 MMOL/L — SIGNIFICANT CHANGE UP (ref 22–29)
COLOR SPEC: YELLOW — SIGNIFICANT CHANGE UP
CREAT SERPL-MCNC: 1.16 MG/DL — SIGNIFICANT CHANGE UP (ref 0.5–1.3)
DIFF PNL FLD: ABNORMAL
EPI CELLS # UR: SIGNIFICANT CHANGE UP
GLUCOSE BLDC GLUCOMTR-MCNC: 182 MG/DL — HIGH (ref 70–99)
GLUCOSE BLDC GLUCOMTR-MCNC: 193 MG/DL — HIGH (ref 70–99)
GLUCOSE BLDC GLUCOMTR-MCNC: 223 MG/DL — HIGH (ref 70–99)
GLUCOSE BLDC GLUCOMTR-MCNC: 243 MG/DL — HIGH (ref 70–99)
GLUCOSE BLDC GLUCOMTR-MCNC: 249 MG/DL — HIGH (ref 70–99)
GLUCOSE SERPL-MCNC: 238 MG/DL — HIGH (ref 70–99)
GLUCOSE UR QL: 1000 MG/DL
KETONES UR-MCNC: ABNORMAL
LEUKOCYTE ESTERASE UR-ACNC: NEGATIVE — SIGNIFICANT CHANGE UP
NITRITE UR-MCNC: NEGATIVE — SIGNIFICANT CHANGE UP
PH UR: 6 — SIGNIFICANT CHANGE UP (ref 5–8)
POTASSIUM SERPL-MCNC: 4.6 MMOL/L — SIGNIFICANT CHANGE UP (ref 3.5–5.3)
POTASSIUM SERPL-SCNC: 4.6 MMOL/L — SIGNIFICANT CHANGE UP (ref 3.5–5.3)
PROT UR-MCNC: 30 MG/DL
RBC CASTS # UR COMP ASSIST: SIGNIFICANT CHANGE UP /HPF (ref 0–4)
SODIUM SERPL-SCNC: 139 MMOL/L — SIGNIFICANT CHANGE UP (ref 135–145)
SP GR SPEC: 1.02 — SIGNIFICANT CHANGE UP (ref 1.01–1.02)
UROBILINOGEN FLD QL: NEGATIVE MG/DL — SIGNIFICANT CHANGE UP
WBC UR QL: SIGNIFICANT CHANGE UP

## 2020-03-04 PROCEDURE — 93458 L HRT ARTERY/VENTRICLE ANGIO: CPT | Mod: 26

## 2020-03-04 PROCEDURE — 99233 SBSQ HOSP IP/OBS HIGH 50: CPT

## 2020-03-04 PROCEDURE — 99152 MOD SED SAME PHYS/QHP 5/>YRS: CPT

## 2020-03-04 RX ORDER — CARVEDILOL PHOSPHATE 80 MG/1
3.12 CAPSULE, EXTENDED RELEASE ORAL EVERY 12 HOURS
Refills: 0 | Status: DISCONTINUED | OUTPATIENT
Start: 2020-03-04 | End: 2020-03-05

## 2020-03-04 RX ORDER — FUROSEMIDE 40 MG
40 TABLET ORAL DAILY
Refills: 0 | Status: DISCONTINUED | OUTPATIENT
Start: 2020-03-04 | End: 2020-03-08

## 2020-03-04 RX ADMIN — CARVEDILOL PHOSPHATE 3.12 MILLIGRAM(S): 80 CAPSULE, EXTENDED RELEASE ORAL at 19:29

## 2020-03-04 RX ADMIN — Medication 40 MILLIGRAM(S): at 05:21

## 2020-03-04 RX ADMIN — INSULIN GLARGINE 10 UNIT(S): 100 INJECTION, SOLUTION SUBCUTANEOUS at 21:09

## 2020-03-04 RX ADMIN — Medication 81 MILLIGRAM(S): at 09:53

## 2020-03-04 RX ADMIN — Medication 3 MILLILITER(S): at 09:14

## 2020-03-04 RX ADMIN — Medication 0.1 MILLIGRAM(S): at 05:21

## 2020-03-04 RX ADMIN — Medication 4: at 09:54

## 2020-03-04 RX ADMIN — Medication 40 MILLIGRAM(S): at 12:10

## 2020-03-04 RX ADMIN — Medication 4 UNIT(S): at 09:54

## 2020-03-04 RX ADMIN — Medication 3 MILLILITER(S): at 03:52

## 2020-03-04 RX ADMIN — LISINOPRIL 10 MILLIGRAM(S): 2.5 TABLET ORAL at 05:21

## 2020-03-04 NOTE — PROGRESS NOTE ADULT - ASSESSMENT
Pt is a 60 y/o male with medical history of sleep apnea on CPAP, HTN, HLD, who presents to [facility]ED for SOB. Pt states that since Monday 5:30 AM he has been feeling SOB and coughing which got progressively worse. Pt came to [facility]ED for workup. Pt BNP-260, Tropx2- negative, Xray- cardiomegaly with clear lungs, Echo-reduced LV sys. fx. EF 40-45%, mild to mod. MR, mod. AR- Some NSVT frequent PVCs- Start Coreg 3.125mg PO BID,   Referred for [facility] for further evaluation       PRE-PROCEDURE ASSESSMENT    -Patient seen and examined  -Labs reviewed  -Pre-procedure teaching completed with patient and family  -Informed consent witnessed  -Questions answered  _ ASA 81 mg po now     Risks & benefits of procedure and sedation and risks and benefits for the alternative therapy have been explained to the patient in layman’s terms including but not limited to: allergic reaction, bleeding, infection, arrhythmia, respiratory compromise, renal and vascular compromise, limb damage, MI, CVA, emergent CABG/Vascular Surgery and death. Informed consent obtained and in chart. Pt is a 58 y/o male with medical history of sleep apnea on CPAP, HTN, HLD, who presents to [facility] for SOB. Pt states that since Monday 5:30 AM he has been feeling SOB and coughing which got progressively worse. Pt came to [facility] for workup. Pt BNP-260, Tropx2- negative, Xray- cardiomegaly with clear lungs, Echo-reduced LV sys. fx. EF 40-45%, mild to mod. MR, mod. AR- Some NSVT frequent PVCs- Start Coreg 3.125mg PO BID,   Referred for [facility] for further evaluation       PRE-PROCEDURE ASSESSMENT  LHC   -Patient seen and examined  -Labs reviewed  -Pre-procedure teaching completed with patient and family  -Informed consent witnessed  -Questions answered  _ ASA 81 mg po today     Risks & benefits of procedure and sedation and risks and benefits for the alternative therapy have been explained to the patient in layman’s terms including but not limited to: allergic reaction, bleeding, infection, arrhythmia, respiratory compromise, renal and vascular compromise, limb damage, MI, CVA, emergent CABG/Vascular Surgery and death. Informed consent obtained and in chart.

## 2020-03-04 NOTE — PROGRESS NOTE ADULT - ASSESSMENT
The patient is a 59 year old male with a history of diabetes mellitus type 2, hypertension, JADON on CPAP and a family history of CAD who presented to the Er with complaints of dry cough, shortness of breath and difficulty breathing. Admitted to the ER for acute hypoxic respiratory failure initially requiring BIpap improved with IV lasix and solumedrol transitioned to nasal cannula. Chest xray showed cardiomegaly with clear lungs, BNP was 200 and cardiac enzymes were negative as well as influenza panel. Echcoardiogram with EF of 40-45%; cardiology consulted     Assessment/Plan:    1 Acute hypoxic respiratory failure: Unclear etiology  Evaluated by cardiology; [facility] today  Continue IV lasix  Echocardiogram with EF of 40-45%; cardiology evaluation requested  Wean IV steroids- patient is a non smoker with no wheezing on examination    2. JADON on nocturnal cpap    3. Diabetes mellitus type 2; Continue lantus   humalog 4 units TID with meals    4. Hypertension on lisinopril    VTE- lovenox subcut The patient is a 59 year old male with a history of diabetes mellitus type 2, hypertension, JADON on CPAP and a family history of CAD who presented to the Er with complaints of dry cough, shortness of breath and difficulty breathing. Admitted to the ER for acute hypoxic respiratory failure initially requiring BIpap improved with IV lasix and solumedrol transitioned to nasal cannula. Chest xray showed cardiomegaly with clear lungs, BNP was 200 and cardiac enzymes were negative as well as influenza panel. Echcoardiogram with EF of 40-45%; cardiology consulted     Assessment/Plan:    1 Acute hypoxic respiratory failure suspect secondary to acute on chronic systolic CHF: IMproving; off oxygen  Evaluated by cardiology; [facility] today  Continue IV lasix  Echocardiogram with EF of 40-45%; cardiology evaluation requested  Wean IV steroids- patient is a non smoker with no wheezing on examination    2. JADON on nocturnal cpap    3. Diabetes mellitus type 2; Continue lantus   humalog 4 units TID with meals    4. Hypertension on lisinopril    VTE- lovenox subcut

## 2020-03-04 NOTE — PROGRESS NOTE ADULT - SUBJECTIVE AND OBJECTIVE BOX
[place] [facility]                                                               Office: [address] [address]                                                              Telephone: [phone]. Fax:[phone]                                                                             PROGRESS NOTE  Reason for follow up: HFrEF  Overnight: 4 beats NSVT  Update: Patient states that he is still experiencing CAIN, but it has overall improved since arriving. Patient negative 660 since arriving on [address] [place]. Plan for [facility] today.     Subjective: "I'm still a little short of breath."  	  Vitals:  T(C): 36.6 (03-04-20 @ 09:20), Max: 37.1 (03-03-20 @ 19:02)  HR: 78 (03-04-20 @ 09:27) (71 - 98)  BP: 149/96 (03-04-20 @ 09:20) (136/86 - 152/89)  RR: 20 (03-04-20 @ 09:20) (18 - 20)  SpO2: 98% (03-04-20 @ 09:27) (97% - 100%)  Wt(kg): --  I&O's Summary    03 Mar 2020 07:01  -  04 Mar 2020 07:00  --------------------------------------------------------  IN: 0 mL / OUT: 400 mL / NET: -400 mL    04 Mar 2020 07:01  -  04 Mar 2020 11:32  --------------------------------------------------------  IN: 260 mL / OUT: 0 mL / NET: 260 mL      Weight (kg): 89.8 (03-02 @ 14:31)      PHYSICAL EXAM:  Appearance: Comfortable. No acute distress  HEENT:  Head and neck: Atraumatic. Normocephalic.  Normal oral mucosa, PERRL, Neck is supple. No JVD, No carotid bruit.   Neurologic: A & O x 3, no focal deficits. EOMI.  Lymphatic: No cervical lymphadenopathy  Cardiovascular: Normal S1 S2, II/VI systolic murmur at apex, rubs/gallops. No JVD, No edema  Respiratory: Lungs clear to auscultation  Gastrointestinal:  Soft, Non-tender, + BS  Lower Extremities: No edema  Psychiatry: Patient is calm. No agitation. Mood & affect appropriate  Skin: No rashes/ ecchymoses/cyanosis/ulcers visualized on the face, hands or feet.      CURRENT MEDICATIONS:  carvedilol 3.125 milliGRAM(s) Oral every 12 hours  cloNIDine 0.1 milliGRAM(s) Oral daily  furosemide   Injectable 40 milliGRAM(s) IV Push daily  lisinopril 10 milliGRAM(s) Oral daily    ALBUTerol    90 MICROgram(s) HFA Inhaler  albuterol/ipratropium for Nebulization  tiotropium 18 MICROgram(s) Capsule  dextrose 50% Injectable  dextrose 50% Injectable  dextrose 50% Injectable  insulin glargine Injectable (LANTUS)  insulin lispro (HumaLOG) corrective regimen sliding scale  insulin lispro (HumaLOG) corrective regimen sliding scale  insulin lispro Injectable (HumaLOG)  methylPREDNISolone sodium succinate Injectable  aspirin enteric coated  dextrose 5%.  enoxaparin Injectable  influenza   Vaccine      DIAGNOSTIC TESTING:  [ ] Echocardiogram:   < from: TTE Echo Complete w/o contrast w/ Doppler (03.03.20 @ 09:39) >  PHYSICIAN INTERPRETATION:  Left Ventricle: Endocardial visualization was enhanced with intravenous echo contrast. The left ventricular internal cavity size is normal.  Global LV systolic function was moderately decreased. Left ventricular ejection fraction, by visual estimation, is 40 to 45%. The left ventricular diastolic function could not be assessed in this study.  Right Ventricle: The right ventricular size is normal. RV systolic function is normal.  Left Atrium: Severely enlarged left atrium.  Right Atrium: Mildly enlarged right atrium.  Pericardium: There is no evidence of pericardial effusion.  Mitral Valve: The mitral valve is normal in structure. Mild thickening of the anterior and posterior mitral valve leaflets. There is mild mitral annular calcification. No evidence of mitral stenosis. Mild to moderate mitral valve regurgitation is seen. The MR jet is centrally-directed.  Tricuspid Valve: Structurally normal tricuspid valve, with normal leaflet excursion. Mild tricuspid regurgitation is visualized. Adequate TR velocity was not obtained to accurately assess RVSP.  Aortic Valve: The aortic valve is trileaflet. No evidence of aortic stenosis. Sclerotic aortic valve with normal opening. Peak transaortic gradient equals 6.7 mmHg, mean transaortic gradient equals 3.3 mmHg, the calculated aortic valve area equals 3.98 cm² by the continuity equation consistent with normally opening aortic valve. Moderate aortic valve regurgitation is seen.  Pulmonic Valve: Trace pulmonic valve regurgitation.  Aorta: The aortic root is normal in size and structure.  Venous: The inferior vena cava was dilated, with respiratory size variation greater than 50%.       Summary:   1. Endocardial visualization was enhanced with intravenous echo contrast.   2. Left ventricular ejection fraction, by visual estimation, is 40 to 45%.   3. Moderately decreased global left ventricular systolic function.   4. The left ventricular diastolic function could not be assessed in this study.   5. Normal left ventricular internal cavity size.   6. There is mild concentric left ventricular hypertrophy.   7. Severely enlarged left atrium.   8. Mildly enlarged right atrium.   9. Mild mitral annular calcification.  10. Mild thickening of the anterior and posterior mitral valve leaflets.  11. Mild to moderate mitral valve regurgitation.  12. Sclerotic aortic valve with normal opening.  13. Moderate aortic regurgitation.  14. There is no evidence of pericardial effusion.    MD Simeon Electronically signed on 3/3/2020 at 11:13:06 AM    < end of copied text >    OTHER: 	      LABS:	 	  CARDIAC MARKERS ( 03 Mar 2020 07:23 )  x     / <0.01 ng/mL / x     / x     / x      p-BNP 03 Mar 2020 07:23: x    , CARDIAC MARKERS ( 02 Mar 2020 16:26 )  x     / <0.01 ng/mL / x     / x     / x      p-BNP 02 Mar 2020 16:26: 260 pg/mL                          14.5   8.13  )-----------( 237      ( 03 Mar 2020 07:24 )             44.6     03-04    139  |  99  |  25.0<H>  ----------------------------<  238<H>  4.6   |  26.0  |  1.16    Ca    9.3      04 Mar 2020 05:58  Mg     1.8     03-02    TPro  6.9  /  Alb  4.0  /  TBili  1.1  /  DBili  x   /  AST  44<H>  /  ALT  121<H>  /  AlkPhos  72  03-03    proBNP: Serum Pro-Brain Natriuretic Peptide: 260 pg/mL (03-02 @ 16:26)    Lipid Profile: Date: 03-03 @ 07:24  Total cholesterol 118; Direct LDL: 49; HDL: 49; Triglycerides:99    HgA1c: Hemoglobin A1C, Whole Blood: 8.6 %    TSH:       TELEMETRY: Reviewed  SR, NSVT

## 2020-03-04 NOTE — PROGRESS NOTE ADULT - SUBJECTIVE AND OBJECTIVE BOX
CC: Follow up    INTERVAL HPI/OVERNIGHT EVENTS: Patient seen and examined,       Vital Signs Last 24 Hrs  T(C): 36.6 (04 Mar 2020 09:20), Max: 37.1 (03 Mar 2020 19:02)  T(F): 97.9 (04 Mar 2020 09:20), Max: 98.7 (03 Mar 2020 19:02)  HR: 78 (04 Mar 2020 09:27) (71 - 98)  BP: 149/96 (04 Mar 2020 09:20) (136/86 - 152/89)  BP(mean): --  RR: 20 (04 Mar 2020 09:20) (18 - 20)  SpO2: 98% (04 Mar 2020 09:27) (97% - 100%)    PHYSICAL EXAM:    GENERAL: NAD, AOX3  HEAD:  Atraumatic, Normocephalic  EYES: EOMI, PERRLA, conjunctiva and sclera clear  ENMT: Moist mucous membranes  CHEST/LUNG: Clear to auscultation bilaterally; No rales, rhonchi, wheezing, or rubs  HEART: Regular rate and rhythm; No murmurs, rubs, or gallops  ABDOMEN: Soft, Nontender, Nondistended; Bowel sounds present  EXTREMITIES:  2+ Peripheral Pulses, No clubbing, cyanosis, Trace pedal edema bilaterally        MEDICATIONS  (STANDING):  ALBUTerol    90 MICROgram(s) HFA Inhaler 1 Puff(s) Inhalation every 4 hours  albuterol/ipratropium for Nebulization 3 milliLiter(s) Nebulizer every 6 hours  aspirin enteric coated 81 milliGRAM(s) Oral daily  cloNIDine 0.1 milliGRAM(s) Oral daily  dextrose 5%. 1000 milliLiter(s) (50 mL/Hr) IV Continuous <Continuous>  dextrose 50% Injectable 12.5 Gram(s) IV Push once  dextrose 50% Injectable 25 Gram(s) IV Push once  dextrose 50% Injectable 25 Gram(s) IV Push once  enoxaparin Injectable 40 milliGRAM(s) SubCutaneous daily  influenza   Vaccine 0.5 milliLiter(s) IntraMuscular once  insulin glargine Injectable (LANTUS) 10 Unit(s) SubCutaneous at bedtime  insulin lispro (HumaLOG) corrective regimen sliding scale   SubCutaneous three times a day before meals  insulin lispro (HumaLOG) corrective regimen sliding scale   SubCutaneous at bedtime  insulin lispro Injectable (HumaLOG) 4 Unit(s) SubCutaneous three times a day before meals  lisinopril 10 milliGRAM(s) Oral daily  methylPREDNISolone sodium succinate Injectable 40 milliGRAM(s) IV Push daily  tiotropium 18 MICROgram(s) Capsule 1 Capsule(s) Inhalation daily    MEDICATIONS  (PRN):  ALBUTerol    0.083% 2.5 milliGRAM(s) Nebulizer every 2 hours PRN Shortness of Breath and/or Wheezing  dextrose 40% Gel 15 Gram(s) Oral once PRN Blood Glucose LESS THAN 70 milliGRAM(s)/deciliter  glucagon  Injectable 1 milliGRAM(s) IntraMuscular once PRN Glucose LESS THAN 70 milligrams/deciliter  guaiFENesin   Syrup  (Sugar-Free) 200 milliGRAM(s) Oral every 6 hours PRN Cough      Allergies    No Known Allergies    Intolerances          LABS:                          14.5   8.13  )-----------( 237      ( 03 Mar 2020 07:24 )             44.6     03-04    139  |  99  |  25.0<H>  ----------------------------<  238<H>  4.6   |  26.0  |  1.16    Ca    9.3      04 Mar 2020 05:58  Mg     1.8     03-02    TPro  6.9  /  Alb  4.0  /  TBili  1.1  /  DBili  x   /  AST  44<H>  /  ALT  121<H>  /  AlkPhos  72  03-03    PT/INR - ( 02 Mar 2020 19:34 )   PT: 12.3 sec;   INR: 1.09 ratio         PTT - ( 02 Mar 2020 19:34 )  PTT:26.4 sec  Urinalysis Basic - ( 04 Mar 2020 08:16 )    Color: Yellow / Appearance: Clear / S.020 / pH: x  Gluc: x / Ketone: Trace  / Bili: Negative / Urobili: Negative mg/dL   Blood: x / Protein: 30 mg/dL / Nitrite: Negative   Leuk Esterase: Negative / RBC: 0-2 /HPF / WBC 0-2   Sq Epi: x / Non Sq Epi: Occasional / Bacteria: Negative        RADIOLOGY & ADDITIONAL TESTS: CC: Follow up    INTERVAL HPI/OVERNIGHT EVENTS: Patient seen and examined, feels better. Off oxygen. Still has complains of dyspnea on exertion to the bathroom       Vital Signs Last 24 Hrs  T(C): 36.6 (04 Mar 2020 09:20), Max: 37.1 (03 Mar 2020 19:02)  T(F): 97.9 (04 Mar 2020 09:20), Max: 98.7 (03 Mar 2020 19:02)  HR: 78 (04 Mar 2020 09:27) (71 - 98)  BP: 149/96 (04 Mar 2020 09:20) (136/86 - 152/89)  BP(mean): --  RR: 20 (04 Mar 2020 09:20) (18 - 20)  SpO2: 98% (04 Mar 2020 09:27) (97% - 100%)    PHYSICAL EXAM:    GENERAL: NAD, AOX3  HEAD:  Atraumatic, Normocephalic  EYES: EOMI, PERRLA, conjunctiva and sclera clear  ENMT: Moist mucous membranes  CHEST/LUNG: Clear to auscultation bilaterally; No rales, rhonchi, wheezing, or rubs  HEART: Regular rate and rhythm; No murmurs, rubs, or gallops  ABDOMEN: Soft, Nontender, Nondistended; Bowel sounds present  EXTREMITIES:  2+ Peripheral Pulses, No clubbing, cyanosis, Trace pedal edema bilaterally        MEDICATIONS  (STANDING):  ALBUTerol    90 MICROgram(s) HFA Inhaler 1 Puff(s) Inhalation every 4 hours  albuterol/ipratropium for Nebulization 3 milliLiter(s) Nebulizer every 6 hours  aspirin enteric coated 81 milliGRAM(s) Oral daily  cloNIDine 0.1 milliGRAM(s) Oral daily  dextrose 5%. 1000 milliLiter(s) (50 mL/Hr) IV Continuous <Continuous>  dextrose 50% Injectable 12.5 Gram(s) IV Push once  dextrose 50% Injectable 25 Gram(s) IV Push once  dextrose 50% Injectable 25 Gram(s) IV Push once  enoxaparin Injectable 40 milliGRAM(s) SubCutaneous daily  influenza   Vaccine 0.5 milliLiter(s) IntraMuscular once  insulin glargine Injectable (LANTUS) 10 Unit(s) SubCutaneous at bedtime  insulin lispro (HumaLOG) corrective regimen sliding scale   SubCutaneous three times a day before meals  insulin lispro (HumaLOG) corrective regimen sliding scale   SubCutaneous at bedtime  insulin lispro Injectable (HumaLOG) 4 Unit(s) SubCutaneous three times a day before meals  lisinopril 10 milliGRAM(s) Oral daily  methylPREDNISolone sodium succinate Injectable 40 milliGRAM(s) IV Push daily  tiotropium 18 MICROgram(s) Capsule 1 Capsule(s) Inhalation daily    MEDICATIONS  (PRN):  ALBUTerol    0.083% 2.5 milliGRAM(s) Nebulizer every 2 hours PRN Shortness of Breath and/or Wheezing  dextrose 40% Gel 15 Gram(s) Oral once PRN Blood Glucose LESS THAN 70 milliGRAM(s)/deciliter  glucagon  Injectable 1 milliGRAM(s) IntraMuscular once PRN Glucose LESS THAN 70 milligrams/deciliter  guaiFENesin   Syrup  (Sugar-Free) 200 milliGRAM(s) Oral every 6 hours PRN Cough      Allergies    No Known Allergies    Intolerances          LABS:                          14.5   8.13  )-----------( 237      ( 03 Mar 2020 07:24 )             44.6     03-04    139  |  99  |  25.0<H>  ----------------------------<  238<H>  4.6   |  26.0  |  1.16    Ca    9.3      04 Mar 2020 05:58  Mg     1.8     03-02    TPro  6.9  /  Alb  4.0  /  TBili  1.1  /  DBili  x   /  AST  44<H>  /  ALT  121<H>  /  AlkPhos  72  03-03    PT/INR - ( 02 Mar 2020 19:34 )   PT: 12.3 sec;   INR: 1.09 ratio         PTT - ( 02 Mar 2020 19:34 )  PTT:26.4 sec  Urinalysis Basic - ( 04 Mar 2020 08:16 )    Color: Yellow / Appearance: Clear / S.020 / pH: x  Gluc: x / Ketone: Trace  / Bili: Negative / Urobili: Negative mg/dL   Blood: x / Protein: 30 mg/dL / Nitrite: Negative   Leuk Esterase: Negative / RBC: 0-2 /HPF / WBC 0-2   Sq Epi: x / Non Sq Epi: Occasional / Bacteria: Negative        RADIOLOGY & ADDITIONAL TESTS:

## 2020-03-04 NOTE — PROGRESS NOTE ADULT - ASSESSMENT
Pt is a 60 y/o male with medical history of sleep apnea on CPAP, HTN, HLD, who presents to [facility] for SOB. Pt states that since Monday 5:30 AM he has been feeling SOB and coughing which got progressively worse. Pt came to [facility] for workup. Pt BNP-260, Tropx2- negative, Xray- cardiomegaly with clear lungs, Echo-reduced LV sys. fx. EF 40-45%, mild to mod. MR, mod. AR. Pt Denies fever, chills, PND, edema, chest pain, pressure, palpitations, irregular, fast heart beat, nausea, vomiting, melena, rectal bleed, hematuria, lightheadedness, dizziness, syncope, near syncope. Pt has c/o of distended abd. and increased weight approx 3 lbs within past couple of days.  Troponin neg x 2      1. Acute decompensated HFrEF   - EF 40-45% on last echo  - Some NSVT overnight, frequent PVCs  - Start Coreg 3.125mg PO BID, uptirtrate as tolerated  - Lasix 40mg IV daily   - Plan for [facility] today  - NPO   - Continue aspirin and lisinopril    2. Hypertension  - Stated on Clonidine once daily?  - Added Coreg  - Continue lisinopril     3. DMII  - Continue insulin sliding scale     Preliminary evaluation, please await official recommendations by Dr. Bonilla

## 2020-03-04 NOTE — PROGRESS NOTE ADULT - SUBJECTIVE AND OBJECTIVE BOX
Interventional Cardiology NP Precath Note  Pt presents to cardiac cath lab for LHC due to HF         Mallampati   ASA   BRA   GFR     ALL: NKDA, NKFA. Denies shellfish/Contrast dye allergy.  PAST MEDICAL & SURGICAL HISTORY:  Obstructive sleep apnea  DM (diabetes mellitus)  HTN (hypertension)  No significant past surgical history        MEDS:   ALBUTerol    0.083% 2.5 milliGRAM(s) Nebulizer every 2 hours PRN  ALBUTerol    90 MICROgram(s) HFA Inhaler 1 Puff(s) Inhalation every 4 hours  albuterol/ipratropium for Nebulization 3 milliLiter(s) Nebulizer every 6 hours  aspirin enteric coated 81 milliGRAM(s) Oral daily  carvedilol 3.125 milliGRAM(s) Oral every 12 hours  cloNIDine 0.1 milliGRAM(s) Oral daily  enoxaparin Injectable 40 milliGRAM(s) SubCutaneous daily  furosemide   Injectable 40 milliGRAM(s) IV Push daily  glucagon  Injectable 1 milliGRAM(s) IntraMuscular once PRN  guaiFENesin   Syrup  (Sugar-Free) 200 milliGRAM(s) Oral every 6 hours PRN  influenza   Vaccine 0.5 milliLiter(s) IntraMuscular once  insulin glargine Injectable (LANTUS) 10 Unit(s) SubCutaneous at bedtime  insulin lispro (HumaLOG) corrective regimen sliding scale   SubCutaneous three times a day before meals  insulin lispro (HumaLOG) corrective regimen sliding scale   SubCutaneous at bedtime  insulin lispro Injectable (HumaLOG) 4 Unit(s) SubCutaneous three times a day before meals  lisinopril 10 milliGRAM(s) Oral daily  methylPREDNISolone sodium succinate Injectable 40 milliGRAM(s) IV Push daily  tiotropium 18 MICROgram(s) Capsule 1 Capsule(s) Inhalation daily    T(C): 36.6 (03-04-20 @ 09:20), Max: 37.1 (03-03-20 @ 19:02)  HR: 85 (03-04-20 @ 14:56) (71 - 98)  BP: 150/86 (03-04-20 @ 12:08) (136/86 - 152/89)  RR: 20 (03-04-20 @ 09:20) (18 - 20)  SpO2: 97% (03-04-20 @ 14:56) (97% - 100%)    NEURO: A & O x 3, no focal neurologic deficits  HEENT: NCAT, EOMI, PERRLA  NECK: No JVD, No carotid bruits B/L, +2 Carotid pulses B/L  PULM:  CTA B/L No W/R/R  CARD: RRR, +S1, +S2, No M/R/G  ABD: ND, +BS, NT, no masses  EXT: Warm, pedal edema yes/no, pitting/non-pitting       TTE Echo Complete w/o contrast w/ Doppler (03.03.20 @ 09:39) >  Summary:   1. Endocardial visualization was enhanced with intravenous echo contrast.   2. Left ventricular ejection fraction, by visual estimation, is 40 to 45%.   3. Moderately decreased global left ventricular systolic function.   4. The left ventricular diastolic function could not be assessed in this study.   5. Normal left ventricular internal cavity size.   6. There is mild concentric left ventricular hypertrophy.   7. Severely enlarged left atrium.   8. Mildly enlarged right atrium.   9. Mild mitral annular calcification.  10. Mild thickening of the anterior and posterior mitral valve leaflets.  11. Mild to moderate mitral valve regurgitation.  12. Sclerotic aortic valve with normal opening.  13. Moderate aortic regurgitation.  14. There is no evidence of pericardial effusion.                        14.5   8.13  )-----------( 237      ( 03 Mar 2020 07:24 )             44.6     03-04    139  |  99  |  25.0<H>  ----------------------------<  238<H>  4.6   |  26.0  |  1.16    Ca    9.3      04 Mar 2020 05:58  Mg     1.8     03-02    TPro  6.9  /  Alb  4.0  /  TBili  1.1  /  DBili  x   /  AST  44<H>  /  ALT  121<H>  /  AlkPhos  72  03-03    CARDIAC MARKERS ( 03 Mar 2020 07:23 )  x     / <0.01 ng/mL / x     / x     / x      CARDIAC MARKERS ( 02 Mar 2020 16:26 )  x     / <0.01 ng/mL / x     / x     / x Interventional Cardiology NP Precath Note  Pt presents to cardiac cath lab for LHC due to HF         Mallampati 2  ASA 3  BRA 1.0  GFR 79     ALL: NKDA, NKFA. Denies shellfish/Contrast dye allergy.  PAST MEDICAL & SURGICAL HISTORY:  Obstructive sleep apnea  DM (diabetes mellitus)  HTN (hypertension)  No significant past surgical history        MEDS:   ALBUTerol    0.083% 2.5 milliGRAM(s) Nebulizer every 2 hours PRN  ALBUTerol    90 MICROgram(s) HFA Inhaler 1 Puff(s) Inhalation every 4 hours  albuterol/ipratropium for Nebulization 3 milliLiter(s) Nebulizer every 6 hours  aspirin enteric coated 81 milliGRAM(s) Oral daily  carvedilol 3.125 milliGRAM(s) Oral every 12 hours  cloNIDine 0.1 milliGRAM(s) Oral daily  enoxaparin Injectable 40 milliGRAM(s) SubCutaneous daily  furosemide   Injectable 40 milliGRAM(s) IV Push daily  glucagon  Injectable 1 milliGRAM(s) IntraMuscular once PRN  guaiFENesin   Syrup  (Sugar-Free) 200 milliGRAM(s) Oral every 6 hours PRN  influenza   Vaccine 0.5 milliLiter(s) IntraMuscular once  insulin glargine Injectable (LANTUS) 10 Unit(s) SubCutaneous at bedtime  insulin lispro (HumaLOG) corrective regimen sliding scale   SubCutaneous three times a day before meals  insulin lispro (HumaLOG) corrective regimen sliding scale   SubCutaneous at bedtime  insulin lispro Injectable (HumaLOG) 4 Unit(s) SubCutaneous three times a day before meals  lisinopril 10 milliGRAM(s) Oral daily  methylPREDNISolone sodium succinate Injectable 40 milliGRAM(s) IV Push daily  tiotropium 18 MICROgram(s) Capsule 1 Capsule(s) Inhalation daily    T(C): 36.6 (03-04-20 @ 09:20), Max: 37.1 (03-03-20 @ 19:02)  HR: 85 (03-04-20 @ 14:56) (71 - 98)  BP: 150/86 (03-04-20 @ 12:08) (136/86 - 152/89)  RR: 20 (03-04-20 @ 09:20) (18 - 20)  SpO2: 97% (03-04-20 @ 14:56) (97% - 100%)    NEURO: A & O x 3, no focal neurologic deficits  HEENT: NCAT, EOMI, PERRLA  NECK: No JVD, No carotid bruits B/L, +2 Carotid pulses B/L  PULM:  CTA B/L No W/R/R  CARD: RRR, +S1, +S2, 3/6 PROSPER   ABD: ND, +BS, NT, no masses  EXT: Warm + pp    TTE Echo Complete w/o contrast w/ Doppler (03.03.20 @ 09:39) >  Summary:   1. Endocardial visualization was enhanced with intravenous echo contrast.   2. Left ventricular ejection fraction, by visual estimation, is 40 to 45%.   3. Moderately decreased global left ventricular systolic function.   4. The left ventricular diastolic function could not be assessed in this study.   5. Normal left ventricular internal cavity size.   6. There is mild concentric left ventricular hypertrophy.   7. Severely enlarged left atrium.   8. Mildly enlarged right atrium.   9. Mild mitral annular calcification.  10. Mild thickening of the anterior and posterior mitral valve leaflets.  11. Mild to moderate mitral valve regurgitation.  12. Sclerotic aortic valve with normal opening.  13. Moderate aortic regurgitation.  14. There is no evidence of pericardial effusion.                        14.5   8.13  )-----------( 237      ( 03 Mar 2020 07:24 )             44.6     03-04    139  |  99  |  25.0<H>  ----------------------------<  238<H>  4.6   |  26.0  |  1.16    Ca    9.3      04 Mar 2020 05:58  Mg     1.8     03-02    TPro  6.9  /  Alb  4.0  /  TBili  1.1  /  DBili  x   /  AST  44<H>  /  ALT  121<H>  /  AlkPhos  72  03-03    CARDIAC MARKERS ( 03 Mar 2020 07:23 )  x     / <0.01 ng/mL / x     / x     / x      CARDIAC MARKERS ( 02 Mar 2020 16:26 )  x     / <0.01 ng/mL / x     / x     / x

## 2020-03-04 NOTE — PROGRESS NOTE ADULT - SUBJECTIVE AND OBJECTIVE BOX
Department of Cardiology                                                                  [facility]/[address]                                                            Telephone: [phone]. Fax:[phone]                                                                                 Cardiac Cath NP Note       Narrative:  59y  Male is now s/p left heart catheterization via right radial approach (no site complications, radial band in place) with Dr. San:  Right radial precautions  No cardiac intervention  EF 45%  LM normal  LAD normal  Cx normal  RCA normal  LVEDEP 35    < from: Cardiac Cath Lab - Adult (03.04.20 @ 17:53) >  VENTRICLES: Global left ventricular function was moderately depressed. EF  calculated bycontrast ventriculography was 45 %.  VALVES: AORTIC VALVE: No significant aortic valve gradient. MITRAL VALVE:  The mitral valve exhibited mild regurgitation.  CORONARY VESSELS: The coronary circulation is right dominant.  LM:   --  LM: Normal. The vessel was normal sized, not calcified, and not  tortuous. Angiography showed no evidence of disease.  LAD:   --  LAD: Normal. The vessel was normal sized, not calcified, and not  tortuous. Angiography showed mild atherosclerosis with no flow limiting  lesions.  CX:   --  Circumflex: Normal. The vessel was large sized, not calcified,  and not tortuous. Angiography showed minor luminal irregularities with no  flow limiting lesions.  RCA:   --  RCA: Normal. The vessel was normal sized, not calcified,and  moderately tortuous. Angiography showed mild atherosclerosis with no flow  limiting lesions.  COMPLICATIONS: There were no complications. No complications occurred  during the cath lab visit.  DIAGNOSTIC IMPRESSIONS: There is mild irregularity of the coronary anatomy.  Left ventricular function is abnormal.  LVEF=45%  Severe Diastolic Dysfunction (LVEDP=34 mmHg)  DIAGNOSTIC RECOMMENDATIONS: The patient should continue with the present  medications. The patient's diuretic regimen should be carefully increased.  Patient management should include aggressive medical therapy, close  monitoring of BUN and creatinine, resumption of all previous activities in  2 days, a cardiac rehabilitation program, and weight reduction. The  patient should follow a low fat and low calorie diet. Medical management  is recommended.  Prepared and signed by  Jay Jay San MD      PAST MEDICAL & SURGICAL HISTORY:  Obstructive sleep apnea  DM (diabetes mellitus)  HTN (hypertension)  No significant past surgical history    FAMILY HISTORY:  Family history of diabetes mellitus (DM): parents    Home Medications:  cloNIDine:  (02 Mar 2020 19:53)  Lasix:  (02 Mar 2020 19:53)  Lasix:  (02 Mar 2020 19:58)  lisinopril:  (02 Mar 2020 19:52)  metFORMIN:  (02 Mar 2020 19:53)  Trulicity Pen:  (02 Mar 2020 20:17)    Patient is a 59y old  Male who presents with a chief complaint of cough/ wheeze (04 Mar 2020 15:11)    HPI:  60 y/o male with h/o htn, dm and sleep apnea and on cpap of 14 as per pt. came to the ER as he started having dry cough sob with cough ,  wheeze in the chest which he experienced for short period yesterday, got better on its own but all symptoms came back this morning and symptoms were way more that yesterday so he decided to come to the hospital. no sick contact. no recent travel. no cp. no fever. no abd. pain. no n/v/d. Upon arival in the ER pt. was put on bipap, iv solumedrol ( was wheezing as per ER physician dr. De Luna ) , iv lasix and at the time of admission pt. feels better, came off the bipap and on o2 via NC and talking in full sentences. (02 Mar 2020 18:26)    General: No fatigue, no fevers/chills  Respiratory: No dyspnea, no cough, no wheeze  CV: No chest pain, no palpitations  Abd: No nausea  Neuro: No headache, no dizziness  No Known Allergies      Objective:  Vital Signs Last 24 Hrs  T(C): 36.4 (04 Mar 2020 18:45), Max: 37 (03 Mar 2020 22:33)  T(F): 97.5 (04 Mar 2020 18:45), Max: 98.6 (03 Mar 2020 22:33)  HR: 86 (04 Mar 2020 19:00) (71 - 98)  BP: 148/96 (04 Mar 2020 19:00) (148/95 - 152/89)  BP(mean): --  RR: 20 (04 Mar 2020 19:00) (18 - 20)  SpO2: 95% (04 Mar 2020 19:00) (92% - 100%)    CM: SR  Neuro: A&OX3, CN 2-12 intact  HEENT: NC, AT  Lungs: CTA B/L  CV: S1, S2, RRR  Abd: Soft  Right Radial: band in place, no bleeding, no hematoma,  Extremity: + distal pulses                          14.5   8.13  )-----------( 237      ( 03 Mar 2020 07:24 )             44.6     03-04    139  |  99  |  25.0<H>  ----------------------------<  238<H>  4.6   |  26.0  |  1.16    Ca    9.3      04 Mar 2020 05:58  TPro  6.9  /  Alb  4.0  /  TBili  1.1  /  DBili  x   /  AST  44<H>  /  ALT  121<H>  /  AlkPhos  72  03-03  PT/INR - ( 02 Mar 2020 19:34 )   PT: 12.3 sec;   INR: 1.09 ratio    PTT - ( 02 Mar 2020 19:34 )  PTT:26.4 sec    -post cardiac cath orders  -radial precautions  -continue current medical therapy  -bedrest x1 hour post cath  -remove radial band 2 hours post cardiac cath  -BTB when recovery time complete

## 2020-03-05 LAB
ALBUMIN SERPL ELPH-MCNC: 4.2 G/DL — SIGNIFICANT CHANGE UP (ref 3.3–5.2)
ALP SERPL-CCNC: 69 U/L — SIGNIFICANT CHANGE UP (ref 40–120)
ALT FLD-CCNC: 66 U/L — HIGH
ANION GAP SERPL CALC-SCNC: 15 MMOL/L — SIGNIFICANT CHANGE UP (ref 5–17)
AST SERPL-CCNC: 25 U/L — SIGNIFICANT CHANGE UP
BILIRUB SERPL-MCNC: 1.4 MG/DL — SIGNIFICANT CHANGE UP (ref 0.4–2)
BUN SERPL-MCNC: 28 MG/DL — HIGH (ref 8–20)
CALCIUM SERPL-MCNC: 8.9 MG/DL — SIGNIFICANT CHANGE UP (ref 8.6–10.2)
CHLORIDE SERPL-SCNC: 98 MMOL/L — SIGNIFICANT CHANGE UP (ref 98–107)
CO2 SERPL-SCNC: 27 MMOL/L — SIGNIFICANT CHANGE UP (ref 22–29)
CREAT SERPL-MCNC: 1.1 MG/DL — SIGNIFICANT CHANGE UP (ref 0.5–1.3)
GLUCOSE BLDC GLUCOMTR-MCNC: 195 MG/DL — HIGH (ref 70–99)
GLUCOSE BLDC GLUCOMTR-MCNC: 206 MG/DL — HIGH (ref 70–99)
GLUCOSE BLDC GLUCOMTR-MCNC: 208 MG/DL — HIGH (ref 70–99)
GLUCOSE BLDC GLUCOMTR-MCNC: 229 MG/DL — HIGH (ref 70–99)
GLUCOSE BLDC GLUCOMTR-MCNC: 264 MG/DL — HIGH (ref 70–99)
GLUCOSE SERPL-MCNC: 278 MG/DL — HIGH (ref 70–99)
HCT VFR BLD CALC: 46.6 % — SIGNIFICANT CHANGE UP (ref 39–50)
HGB BLD-MCNC: 15.4 G/DL — SIGNIFICANT CHANGE UP (ref 13–17)
MAGNESIUM SERPL-MCNC: 2.5 MG/DL — SIGNIFICANT CHANGE UP (ref 1.6–2.6)
MCHC RBC-ENTMCNC: 29.8 PG — SIGNIFICANT CHANGE UP (ref 27–34)
MCHC RBC-ENTMCNC: 33 GM/DL — SIGNIFICANT CHANGE UP (ref 32–36)
MCV RBC AUTO: 90.1 FL — SIGNIFICANT CHANGE UP (ref 80–100)
PLATELET # BLD AUTO: 260 K/UL — SIGNIFICANT CHANGE UP (ref 150–400)
POTASSIUM SERPL-MCNC: 4.2 MMOL/L — SIGNIFICANT CHANGE UP (ref 3.5–5.3)
POTASSIUM SERPL-SCNC: 4.2 MMOL/L — SIGNIFICANT CHANGE UP (ref 3.5–5.3)
PROT SERPL-MCNC: 7.4 G/DL — SIGNIFICANT CHANGE UP (ref 6.6–8.7)
RBC # BLD: 5.17 M/UL — SIGNIFICANT CHANGE UP (ref 4.2–5.8)
RBC # FLD: 13.9 % — SIGNIFICANT CHANGE UP (ref 10.3–14.5)
SODIUM SERPL-SCNC: 140 MMOL/L — SIGNIFICANT CHANGE UP (ref 135–145)
WBC # BLD: 10.38 K/UL — SIGNIFICANT CHANGE UP (ref 3.8–10.5)
WBC # FLD AUTO: 10.38 K/UL — SIGNIFICANT CHANGE UP (ref 3.8–10.5)

## 2020-03-05 PROCEDURE — 99233 SBSQ HOSP IP/OBS HIGH 50: CPT

## 2020-03-05 PROCEDURE — 93010 ELECTROCARDIOGRAM REPORT: CPT

## 2020-03-05 RX ORDER — CARVEDILOL PHOSPHATE 80 MG/1
6.25 CAPSULE, EXTENDED RELEASE ORAL EVERY 12 HOURS
Refills: 0 | Status: DISCONTINUED | OUTPATIENT
Start: 2020-03-05 | End: 2020-03-06

## 2020-03-05 RX ORDER — METOPROLOL TARTRATE 50 MG
5 TABLET ORAL ONCE
Refills: 0 | Status: COMPLETED | OUTPATIENT
Start: 2020-03-05 | End: 2020-03-05

## 2020-03-05 RX ORDER — APIXABAN 2.5 MG/1
5 TABLET, FILM COATED ORAL EVERY 12 HOURS
Refills: 0 | Status: DISCONTINUED | OUTPATIENT
Start: 2020-03-05 | End: 2020-03-09

## 2020-03-05 RX ORDER — METOPROLOL TARTRATE 50 MG
5 TABLET ORAL EVERY 6 HOURS
Refills: 0 | Status: DISCONTINUED | OUTPATIENT
Start: 2020-03-05 | End: 2020-03-09

## 2020-03-05 RX ADMIN — INSULIN GLARGINE 10 UNIT(S): 100 INJECTION, SOLUTION SUBCUTANEOUS at 21:01

## 2020-03-05 RX ADMIN — Medication 40 MILLIGRAM(S): at 05:16

## 2020-03-05 RX ADMIN — CARVEDILOL PHOSPHATE 6.25 MILLIGRAM(S): 80 CAPSULE, EXTENDED RELEASE ORAL at 17:41

## 2020-03-05 RX ADMIN — Medication 3 MILLILITER(S): at 02:48

## 2020-03-05 RX ADMIN — Medication 6: at 13:34

## 2020-03-05 RX ADMIN — Medication 4 UNIT(S): at 17:45

## 2020-03-05 RX ADMIN — Medication 4: at 08:43

## 2020-03-05 RX ADMIN — APIXABAN 5 MILLIGRAM(S): 2.5 TABLET, FILM COATED ORAL at 17:41

## 2020-03-05 RX ADMIN — Medication 4 UNIT(S): at 08:43

## 2020-03-05 RX ADMIN — Medication 81 MILLIGRAM(S): at 08:42

## 2020-03-05 RX ADMIN — Medication 4: at 17:45

## 2020-03-05 RX ADMIN — Medication 5 MILLIGRAM(S): at 13:56

## 2020-03-05 RX ADMIN — CARVEDILOL PHOSPHATE 3.12 MILLIGRAM(S): 80 CAPSULE, EXTENDED RELEASE ORAL at 05:16

## 2020-03-05 RX ADMIN — Medication 0.1 MILLIGRAM(S): at 05:16

## 2020-03-05 RX ADMIN — Medication 3 MILLILITER(S): at 09:11

## 2020-03-05 RX ADMIN — Medication 5 MILLIGRAM(S): at 10:11

## 2020-03-05 RX ADMIN — LISINOPRIL 10 MILLIGRAM(S): 2.5 TABLET ORAL at 05:16

## 2020-03-05 RX ADMIN — Medication 5 MILLIGRAM(S): at 10:48

## 2020-03-05 RX ADMIN — Medication 4 UNIT(S): at 13:29

## 2020-03-05 NOTE — PROGRESS NOTE ADULT - SUBJECTIVE AND OBJECTIVE BOX
[facility]                          [address] [place] [address]                       Phone: [phone]. Fax:[phone]                      ________________________________________________           Reason for follow up/Overnight events: s/p LHC, no intervention     HPI:  58 y/o male with h/o htn, dm and sleep apnea and on cpap of 14 as per pt. came to the ER as he started having dry cough sob with cough ,  wheeze in the chest which he experienced for short period yesterday, got better on its own but all symptoms came back this morning and symptoms were way more that yesterday so he decided to come to the hospital. no sick contact. no recent travel. no cp. no fever. no abd. pain. no n/v/d. Upon arival in the ER pt. was put on bipap, iv solumedrol ( was wheezing as per ER physician dr. De Luna ) , iv lasix and at the time of admission pt. feels better, came off the bipap and on o2 via NC and talking in full sentences. (02 Mar 2020 18:26)      ROS: All review of systems negative unless indicated otherwise below.                          LAB RESULTS                   COMPLETE BLOOD COUNT( 03 Mar 2020 07:24 )                            14.5 g/dL  8.13 K/uL )---------------( 237 K/uL                        44.6 %      Automated Differential     Auto Basophil # - 0.01 K/uL  Auto Basophil % - 0.1 %  Auto Eosinophil # - 0.00 K/uL  Auto Eosinophil % - 0.0 %  Auto Immature Granulocyte # - X      Auto Immature Granulocyte % - 0.4 %  Auto Lymphocyte # - 0.99 K/uL<L>  Auto Lymphocyte % - 12.2 %<L>  Auto Monocyte # - 0.16 K/uL  Auto Monocyte % - 2.0 %  Auto Neutrophil # - 6.94 K/uL  Auto Neutrophil % - 85.3 %<H>                                  CHEMISTRY                 Basic Metabolic Panel (20 @ 05:58)    139  |  99  |  25.0<H>  ----------------------------<  238<H>  4.6   |  26.0  |  1.16    Ca    9.3      04 Mar 2020 05:58  Mg     1.8     03-02                    Liver Functions (20 @ 07:24))  TPro  6.9  /  Alb  4.0  /  TBili  1.1  /  DBili  x   /  AST  44  /  ALT  121  /  AlkPhos  72     PT/INR/PTT ( 02 Mar 2020 19:34 )                        :                       :      12.3         :       26.4                  .        .                   .              .           .       1.09        .                                                                   Cardiac Enzymes   ( 03 Mar 2020 07:23 )  Troponin T  <0.01,  CPK  X    , CKMB  X    , BNP X        , ( 02 Mar 2020 16:26 )  Troponin T  <0.01,  CPK  X    , CKMB  X    ,                             RADIOLOGY RESULTS: Personally visualized   < from: Xray Chest 1 View-PORTABLE IMMEDIATE (20 @ 16:15) >  IMPRESSION: Cardiomegaly with clear lungs.    MICHI MELÉNDEZ M.D., ATTENDING RADIOLOGIST  This document has been electronically signed. Mar  2 2020  4:22PM    < end of copied text >                          CARDIOLOGY RESULTS: Official Report/Preliminary Verbal Reports    ECHO:< from: TTE Echo Complete w/o contrast w/ Doppler (20 @ 09:39) >  Summary:   1. Endocardial visualization was enhanced with intravenous echo contrast.   2. Left ventricular ejection fraction, by visual estimation, is 40 to 45%.   3. Moderately decreased global left ventricular systolic function.   4. The left ventricular diastolic function could not be assessed in this study.   5. Normal left ventricular internal cavity size.   6. There is mild concentric left ventricular hypertrophy.   7. Severely enlarged left atrium.   8. Mildly enlarged right atrium.   9. Mild mitral annular calcification.  10. Mild thickening of the anterior and posterior mitral valve leaflets.  11. Mild to moderate mitral valve regurgitation.  12. Sclerotic aortic valve with normal opening.  13. Moderate aortic regurgitation.  14. There is no evidence of pericardial effusion.    MD Simeon Electronically signed on 3/3/2020 at 11:13:06 AM    < end of copied text >     CATH:    < from: Cardiac Cath Lab - Adult (20 @ 17:53) >  CORONARY VESSELS: The coronary circulation is right dominant.  LM:   --  LM: Normal. The vessel was normal sized, not calcified, and not  tortuous. Angiography showed no evidence of disease.  LAD:   --  LAD: Normal. The vessel was normal sized, not calcified, and not  tortuous. Angiography showed mild atherosclerosis with no flow limiting  lesions.  CX:   --  Circumflex: Normal. The vessel was large sized, not calcified,  and not tortuous. Angiography showed minor luminal irregularities with no  flow limiting lesions.  RCA:   --  RCA: Normal. The vessel was normal sized, not calcified,and  moderately tortuous. Angiography showed mild atherosclerosis with no flow  limiting lesions.  COMPLICATIONS: There were no complications. No complications occurred  during the cath lab visit.  DIAGNOSTIC IMPRESSIONS: There is mild irregularity of the coronary anatomy.  Left ventricular function is abnormal.  LVEF=45%  Severe Diastolic Dysfunction (LVEDP=34 mmHg)  DIAGNOSTIC RECOMMENDATIONS: The patient should continue with the present  medications. The patient's diuretic regimen should be carefully increased.  Patient management should include aggressive medical therapy, close  monitoring of BUN and creatinine, resumption of all previous activities in  2 days, a cardiac rehabilitation program, and weight reduction. The  patient should follow a low fat and low calorie diet. Medical management  is recommended.  Prepared and signed by  Jay Jay San MD    < end of copied text >                   CARDIOLOGY REVIEW: Personally visualized and reviewed    Telemetry Last 24h: Sinus Arrythmia 80's, no ectopy.                              DAILY WEIGHTS - 48 HOUR TREND     Daily Weight in k.9 (05 Mar 2020 05:18), Weight in k.2 (04 Mar 2020 04:28)                             INTAKE AND OUTPUT - 48 HOUR TREND     20 @ 07:  -  20 @ 07:00  --------------------------------------------------------  IN:  Total IN: 0 mL    OUT:    Voided: 400 mL  Total OUT: 400 mL    Total NET: -400 mL      20 @ 07:01  -  20 @ 07:00  --------------------------------------------------------  IN:  Total IN: 0 mL    OUT:    Voided: 750 mL  Total OUT: 750 mL    Total NET: -750 mL    HOME MEDICATIONS:  cloNIDine:  (02 Mar 2020 19:53)  Lasix:  (02 Mar 2020 19:53)  Lasix:  (02 Mar 2020 19:58)  lisinopril:  (02 Mar 2020 19:52)  metFORMIN:  (02 Mar 2020 19:53)  Trulicity Pen:  (02 Mar 2020 20:17)                             Current Admission Active Medications    ALBUTerol    0.083% 2.5 milliGRAM(s) Nebulizer every 2 hours PRN Shortness of Breath and/or Wheezing  ALBUTerol    90 MICROgram(s) HFA Inhaler 1 Puff(s) Inhalation every 4 hours  albuterol/ipratropium for Nebulization 3 milliLiter(s) Nebulizer every 6 hours  aspirin enteric coated 81 milliGRAM(s) Oral daily  carvedilol 3.125 milliGRAM(s) Oral every 12 hours  cloNIDine 0.1 milliGRAM(s) Oral daily  dextrose 40% Gel 15 Gram(s) Oral once PRN Blood Glucose LESS THAN 70 milliGRAM(s)/deciliter  dextrose 5%. 1000 milliLiter(s) (50 mL/Hr) IV Continuous <Continuous>  dextrose 50% Injectable 12.5 Gram(s) IV Push once  dextrose 50% Injectable 25 Gram(s) IV Push once  dextrose 50% Injectable 25 Gram(s) IV Push once  enoxaparin Injectable 40 milliGRAM(s) SubCutaneous daily  furosemide   Injectable 40 milliGRAM(s) IV Push daily  glucagon  Injectable 1 milliGRAM(s) IntraMuscular once PRN Glucose LESS THAN 70 milligrams/deciliter  guaiFENesin   Syrup  (Sugar-Free) 200 milliGRAM(s) Oral every 6 hours PRN Cough  influenza   Vaccine 0.5 milliLiter(s) IntraMuscular once  insulin glargine Injectable (LANTUS) 10 Unit(s) SubCutaneous at bedtime  insulin lispro (HumaLOG) corrective regimen sliding scale   SubCutaneous three times a day before meals  insulin lispro (HumaLOG) corrective regimen sliding scale   SubCutaneous at bedtime  insulin lispro Injectable (HumaLOG) 4 Unit(s) SubCutaneous three times a day before meals  lisinopril 10 milliGRAM(s) Oral daily  methylPREDNISolone sodium succinate Injectable 40 milliGRAM(s) IV Push daily  tiotropium 18 MICROgram(s) Capsule 1 Capsule(s) Inhalation daily                        PHYSICAL EXAM:    Vital Signs Last 24 Hrs  T(C): 37.2 (05 Mar 2020 05:14), Max: 37.2 (04 Mar 2020 22:30)  T(F): 99 (05 Mar 2020 05:14), Max: 99 (04 Mar 2020 22:30)  HR: 96 (05 Mar 2020 08:49) (78 - 96)  BP: 148/70 (05 Mar 2020 05:14) (133/65 - 158/60)  BP(mean): --  RR: 18 (05 Mar 2020 05:14) (15 - 20)  SpO2: 92% (05 Mar 2020 08:49) (92% - 100%)    GENERAL: NAD  NECK: Supple, No JVD  NERVOUS SYSTEM:  Alert & Oriented X3, non focal neuro exam.   CHEST/LUNG: clear lungs, No rales, rhonchi, wheezing, or rubs  HEART: Regular rate and rhythm; s1 and s2 auscultated, No murmurs, rubs, or gallops  ABDOMEN: Soft, Nontender, Nondistended; Bowel sounds present and normoactive.   EXTREMITIES:  2+ Peripheral Pulses, No clubbing, cyanosis, or edema  CATH SITE: Right wrist benign s/p cath.  No bleeding, no ecchymosis, no hematoma. Extremity Warm to touch, with palpable distal pulses, and brisk capillary refill. [place] [facility]                          [address] [place] [address]                       Phone: [phone]. Fax:[phone]                      ________________________________________________           Reason for follow up/Overnight events: s/p LHC, no intervention     HPI:  58 y/o male with h/o htn, dm and sleep apnea and on cpap of 14 as per pt. came to the ER as he started having dry cough sob with cough ,  wheeze in the chest which he experienced for short period yesterday, got better on its own but all symptoms came back this morning and symptoms were way more that yesterday so he decided to come to the hospital. no sick contact. no recent travel. no cp. no fever. no abd. pain. no n/v/d. Upon arival in the ER pt. was put on bipap, iv solumedrol ( was wheezing as per ER physician dr. De Luna ) , iv lasix and at the time of admission pt. feels better, came off the bipap and on o2 via NC and talking in full sentences. (02 Mar 2020 18:26)    ROS: All review of systems negative unless indicated otherwise below.                          LAB RESULTS                   COMPLETE BLOOD COUNT( 03 Mar 2020 07:24 )                            14.5 g/dL  8.13 K/uL )---------------( 237 K/uL                        44.6 %      Automated Differential     Auto Basophil # - 0.01 K/uL  Auto Basophil % - 0.1 %  Auto Eosinophil # - 0.00 K/uL  Auto Eosinophil % - 0.0 %  Auto Immature Granulocyte # - X      Auto Immature Granulocyte % - 0.4 %  Auto Lymphocyte # - 0.99 K/uL<L>  Auto Lymphocyte % - 12.2 %<L>  Auto Monocyte # - 0.16 K/uL  Auto Monocyte % - 2.0 %  Auto Neutrophil # - 6.94 K/uL  Auto Neutrophil % - 85.3 %<H>                                  CHEMISTRY                 Basic Metabolic Panel (20 @ 05:58)    139  |  99  |  25.0<H>  ----------------------------<  238<H>  4.6   |  26.0  |  1.16    Ca    9.3      04 Mar 2020 05:58  Mg     1.8     03-02                    Liver Functions (20 @ 07:24))  TPro  6.9  /  Alb  4.0  /  TBili  1.1  /  DBili  x   /  AST  44  /  ALT  121  /  AlkPhos  72     PT/INR/PTT ( 02 Mar 2020 19:34 )                        :                       :      12.3         :       26.4                  .        .                   .              .           .       1.09        .                                                                   Cardiac Enzymes   ( 03 Mar 2020 07:23 )  Troponin T  <0.01,  CPK  X    , CKMB  X    , BNP X        , ( 02 Mar 2020 16:26 )  Troponin T  <0.01,  CPK  X    , CKMB  X    ,                             RADIOLOGY RESULTS: Personally visualized   < from: Xray Chest 1 View-PORTABLE IMMEDIATE (20 @ 16:15) >  IMPRESSION: Cardiomegaly with clear lungs.    MICHI MELÉNDEZ M.D., ATTENDING RADIOLOGIST  This document has been electronically signed. Mar  2 2020  4:22PM    < end of copied text >                          CARDIOLOGY RESULTS: Official Report/Preliminary Verbal Reports    ECHO:< from: TTE Echo Complete w/o contrast w/ Doppler (20 @ 09:39) >  Summary:   1. Endocardial visualization was enhanced with intravenous echo contrast.   2. Left ventricular ejection fraction, by visual estimation, is 40 to 45%.   3. Moderately decreased global left ventricular systolic function.   4. The left ventricular diastolic function could not be assessed in this study.   5. Normal left ventricular internal cavity size.   6. There is mild concentric left ventricular hypertrophy.   7. Severely enlarged left atrium.   8. Mildly enlarged right atrium.   9. Mild mitral annular calcification.  10. Mild thickening of the anterior and posterior mitral valve leaflets.  11. Mild to moderate mitral valve regurgitation.  12. Sclerotic aortic valve with normal opening.  13. Moderate aortic regurgitation.  14. There is no evidence of pericardial effusion.    MD Simeon Electronically signed on 3/3/2020 at 11:13:06 AM    < end of copied text >     CATH:    < from: Cardiac Cath Lab - Adult (20 @ 17:53) >  CORONARY VESSELS: The coronary circulation is right dominant.  LM:   --  LM: Normal. The vessel was normal sized, not calcified, and not  tortuous. Angiography showed no evidence of disease.  LAD:   --  LAD: Normal. The vessel was normal sized, not calcified, and not  tortuous. Angiography showed mild atherosclerosis with no flow limiting  lesions.  CX:   --  Circumflex: Normal. The vessel was large sized, not calcified,  and not tortuous. Angiography showed minor luminal irregularities with no  flow limiting lesions.  RCA:   --  RCA: Normal. The vessel was normal sized, not calcified,and  moderately tortuous. Angiography showed mild atherosclerosis with no flow  limiting lesions.  COMPLICATIONS: There were no complications. No complications occurred  during the cath lab visit.  DIAGNOSTIC IMPRESSIONS: There is mild irregularity of the coronary anatomy.  Left ventricular function is abnormal.  LVEF=45%  Severe Diastolic Dysfunction (LVEDP=34 mmHg)  DIAGNOSTIC RECOMMENDATIONS: The patient should continue with the present  medications. The patient's diuretic regimen should be carefully increased.  Patient management should include aggressive medical therapy, close  monitoring of BUN and creatinine, resumption of all previous activities in  2 days, a cardiac rehabilitation program, and weight reduction. The  patient should follow a low fat and low calorie diet. Medical management  is recommended.  Prepared and signed by  Jay Jay San MD    < end of copied text >                   CARDIOLOGY REVIEW: Personally visualized and reviewed    Telemetry Last 24h: Sinus Arrythmia 80's, no ectopy.                              DAILY WEIGHTS - 48 HOUR TREND     Daily Weight in k.9 (05 Mar 2020 05:18), Weight in k.2 (04 Mar 2020 04:28)                             INTAKE AND OUTPUT - 48 HOUR TREND     20 @ 07:  -  20 @ 07:00  --------------------------------------------------------  IN:  Total IN: 0 mL    OUT:    Voided: 400 mL  Total OUT: 400 mL    Total NET: -400 mL      20 @ 07:01  -  20 @ 07:00  --------------------------------------------------------  IN:  Total IN: 0 mL    OUT:    Voided: 750 mL  Total OUT: 750 mL    Total NET: -750 mL    HOME MEDICATIONS:  cloNIDine:  (02 Mar 2020 19:53)  Lasix:  (02 Mar 2020 19:53)  Lasix:  (02 Mar 2020 19:58)  lisinopril:  (02 Mar 2020 19:52)  metFORMIN:  (02 Mar 2020 19:53)  Trulicity Pen:  (02 Mar 2020 20:17)                             Current Admission Active Medications    ALBUTerol    0.083% 2.5 milliGRAM(s) Nebulizer every 2 hours PRN Shortness of Breath and/or Wheezing  ALBUTerol    90 MICROgram(s) HFA Inhaler 1 Puff(s) Inhalation every 4 hours  albuterol/ipratropium for Nebulization 3 milliLiter(s) Nebulizer every 6 hours  aspirin enteric coated 81 milliGRAM(s) Oral daily  carvedilol 3.125 milliGRAM(s) Oral every 12 hours  cloNIDine 0.1 milliGRAM(s) Oral daily  dextrose 40% Gel 15 Gram(s) Oral once PRN Blood Glucose LESS THAN 70 milliGRAM(s)/deciliter  dextrose 5%. 1000 milliLiter(s) (50 mL/Hr) IV Continuous <Continuous>  dextrose 50% Injectable 12.5 Gram(s) IV Push once  dextrose 50% Injectable 25 Gram(s) IV Push once  dextrose 50% Injectable 25 Gram(s) IV Push once  enoxaparin Injectable 40 milliGRAM(s) SubCutaneous daily  furosemide   Injectable 40 milliGRAM(s) IV Push daily  glucagon  Injectable 1 milliGRAM(s) IntraMuscular once PRN Glucose LESS THAN 70 milligrams/deciliter  guaiFENesin   Syrup  (Sugar-Free) 200 milliGRAM(s) Oral every 6 hours PRN Cough  influenza   Vaccine 0.5 milliLiter(s) IntraMuscular once  insulin glargine Injectable (LANTUS) 10 Unit(s) SubCutaneous at bedtime  insulin lispro (HumaLOG) corrective regimen sliding scale   SubCutaneous three times a day before meals  insulin lispro (HumaLOG) corrective regimen sliding scale   SubCutaneous at bedtime  insulin lispro Injectable (HumaLOG) 4 Unit(s) SubCutaneous three times a day before meals  lisinopril 10 milliGRAM(s) Oral daily  methylPREDNISolone sodium succinate Injectable 40 milliGRAM(s) IV Push daily  tiotropium 18 MICROgram(s) Capsule 1 Capsule(s) Inhalation daily                        PHYSICAL EXAM:    Vital Signs Last 24 Hrs  T(C): 37.2 (05 Mar 2020 05:14), Max: 37.2 (04 Mar 2020 22:30)  T(F): 99 (05 Mar 2020 05:14), Max: 99 (04 Mar 2020 22:30)  HR: 96 (05 Mar 2020 08:49) (78 - 96)  BP: 148/70 (05 Mar 2020 05:14) (133/65 - 158/60)  BP(mean): --  RR: 18 (05 Mar 2020 05:14) (15 - 20)  SpO2: 92% (05 Mar 2020 08:49) (92% - 100%)    GENERAL: NAD  NECK: Supple, No JVD  NERVOUS SYSTEM:  Alert & Oriented X3, non focal neuro exam.   CHEST/LUNG: clear lungs, No rales, rhonchi, wheezing, or rubs  HEART: Regular rate and rhythm; s1 and s2 auscultated, No murmurs, rubs, or gallops  ABDOMEN: Soft, Nontender, Nondistended; Bowel sounds present and normoactive.   EXTREMITIES:  2+ Peripheral Pulses, No clubbing, cyanosis, or edema  CATH SITE: Right wrist benign s/p cath.  No bleeding, no ecchymosis, no hematoma. Extremity Warm to touch, with palpable distal pulses, and brisk capillary refill.

## 2020-03-05 NOTE — PROGRESS NOTE ADULT - SUBJECTIVE AND OBJECTIVE BOX
[facility]                          [address] [place] [address]                       Phone: [phone]. Fax:[phone]                      ________________________________________________           Reason for follow up/Overnight events: s/p LHC, no intervention, new onset afib    HPI:  60 y/o male with h/o htn, dm and sleep apnea and on cpap of 14 as per pt. came to the ER as he started having dry cough sob with cough ,  wheeze in the chest which he experienced for short period yesterday, got better on its own but all symptoms came back this morning and symptoms were way more that yesterday so he decided to come to the hospital. no sick contact. no recent travel. no cp. no fever. no abd. pain. no n/v/d. Upon arival in the ER pt. was put on bipap, iv solumedrol ( was wheezing as per ER physician dr. De Luna ) , iv lasix and at the time of admission pt. feels better, came off the bipap and on o2 via NC and talking in full sentences. (02 Mar 2020 18:26)    ROS: All review of systems negative unless indicated otherwise below.                          LAB RESULTS                   COMPLETE BLOOD COUNT( 03 Mar 2020 07:24 )                            14.5 g/dL  8.13 K/uL )---------------( 237 K/uL                        44.6 %      Automated Differential     Auto Basophil # - 0.01 K/uL  Auto Basophil % - 0.1 %  Auto Eosinophil # - 0.00 K/uL  Auto Eosinophil % - 0.0 %  Auto Immature Granulocyte # - X      Auto Immature Granulocyte % - 0.4 %  Auto Lymphocyte # - 0.99 K/uL<L>  Auto Lymphocyte % - 12.2 %<L>  Auto Monocyte # - 0.16 K/uL  Auto Monocyte % - 2.0 %  Auto Neutrophil # - 6.94 K/uL  Auto Neutrophil % - 85.3 %<H>                                  CHEMISTRY                 Basic Metabolic Panel (20 @ 05:58)    139  |  99  |  25.0<H>  ----------------------------<  238<H>  4.6   |  26.0  |  1.16    Ca    9.3      04 Mar 2020 05:58  Mg     1.8     03-02                    Liver Functions (20 @ 07:24))  TPro  6.9  /  Alb  4.0  /  TBili  1.1  /  DBili  x   /  AST  44  /  ALT  121  /  AlkPhos  72     PT/INR/PTT ( 02 Mar 2020 19:34 )                        :                       :      12.3         :       26.4                  .        .                   .              .           .       1.09        .                                                                   Cardiac Enzymes   ( 03 Mar 2020 07:23 )  Troponin T  <0.01,  CPK  X    , CKMB  X    , BNP X        , ( 02 Mar 2020 16:26 )  Troponin T  <0.01,  CPK  X    , CKMB  X    ,                             RADIOLOGY RESULTS: Personally visualized   < from: Xray Chest 1 View-PORTABLE IMMEDIATE (20 @ 16:15) >  IMPRESSION: Cardiomegaly with clear lungs.    MICHI MELÉNDEZ M.D., ATTENDING RADIOLOGIST  This document has been electronically signed. Mar  2 2020  4:22PM    < end of copied text >                          CARDIOLOGY RESULTS: Official Report/Preliminary Verbal Reports    ECHO:< from: TTE Echo Complete w/o contrast w/ Doppler (20 @ 09:39) >  Summary:   1. Endocardial visualization was enhanced with intravenous echo contrast.   2. Left ventricular ejection fraction, by visual estimation, is 40 to 45%.   3. Moderately decreased global left ventricular systolic function.   4. The left ventricular diastolic function could not be assessed in this study.   5. Normal left ventricular internal cavity size.   6. There is mild concentric left ventricular hypertrophy.   7. Severely enlarged left atrium.   8. Mildly enlarged right atrium.   9. Mild mitral annular calcification.  10. Mild thickening of the anterior and posterior mitral valve leaflets.  11. Mild to moderate mitral valve regurgitation.  12. Sclerotic aortic valve with normal opening.  13. Moderate aortic regurgitation.  14. There is no evidence of pericardial effusion.    MD Simeon Electronically signed on 3/3/2020 at 11:13:06 AM    < end of copied text >     CATH:    < from: Cardiac Cath Lab - Adult (20 @ 17:53) >  CORONARY VESSELS: The coronary circulation is right dominant.  LM:   --  LM: Normal. The vessel was normal sized, not calcified, and not  tortuous. Angiography showed no evidence of disease.  LAD:   --  LAD: Normal. The vessel was normal sized, not calcified, and not  tortuous. Angiography showed mild atherosclerosis with no flow limiting  lesions.  CX:   --  Circumflex: Normal. The vessel was large sized, not calcified,  and not tortuous. Angiography showed minor luminal irregularities with no  flow limiting lesions.  RCA:   --  RCA: Normal. The vessel was normal sized, not calcified,and  moderately tortuous. Angiography showed mild atherosclerosis with no flow  limiting lesions.  COMPLICATIONS: There were no complications. No complications occurred  during the cath lab visit.  DIAGNOSTIC IMPRESSIONS: There is mild irregularity of the coronary anatomy.  Left ventricular function is abnormal.  LVEF=45%  Severe Diastolic Dysfunction (LVEDP=34 mmHg)  DIAGNOSTIC RECOMMENDATIONS: The patient should continue with the present  medications. The patient's diuretic regimen should be carefully increased.  Patient management should include aggressive medical therapy, close  monitoring of BUN and creatinine, resumption of all previous activities in  2 days, a cardiac rehabilitation program, and weight reduction. The  patient should follow a low fat and low calorie diet. Medical management  is recommended.  Prepared and signed by  Jay Jay San MD    < end of copied text >                   CARDIOLOGY REVIEW: Personally visualized and reviewed    Telemetry Last 24h: Sinus Arrythmia 80's, no ectopy.                              DAILY WEIGHTS - 48 HOUR TREND     Daily Weight in k.9 (05 Mar 2020 05:18), Weight in k.2 (04 Mar 2020 04:28)                             INTAKE AND OUTPUT - 48 HOUR TREND     20 @ 07:  -  20 @ 07:00  --------------------------------------------------------  IN:  Total IN: 0 mL    OUT:    Voided: 400 mL  Total OUT: 400 mL    Total NET: -400 mL      20 @ 07:01  -  20 @ 07:00  --------------------------------------------------------  IN:  Total IN: 0 mL    OUT:    Voided: 750 mL  Total OUT: 750 mL    Total NET: -750 mL    HOME MEDICATIONS:  cloNIDine:  (02 Mar 2020 19:53)  Lasix:  (02 Mar 2020 19:53)  Lasix:  (02 Mar 2020 19:58)  lisinopril:  (02 Mar 2020 19:52)  metFORMIN:  (02 Mar 2020 19:53)  Trulicity Pen:  (02 Mar 2020 20:17)                             Current Admission Active Medications    ALBUTerol    0.083% 2.5 milliGRAM(s) Nebulizer every 2 hours PRN Shortness of Breath and/or Wheezing  ALBUTerol    90 MICROgram(s) HFA Inhaler 1 Puff(s) Inhalation every 4 hours  albuterol/ipratropium for Nebulization 3 milliLiter(s) Nebulizer every 6 hours  aspirin enteric coated 81 milliGRAM(s) Oral daily  carvedilol 3.125 milliGRAM(s) Oral every 12 hours  cloNIDine 0.1 milliGRAM(s) Oral daily  dextrose 40% Gel 15 Gram(s) Oral once PRN Blood Glucose LESS THAN 70 milliGRAM(s)/deciliter  dextrose 5%. 1000 milliLiter(s) (50 mL/Hr) IV Continuous <Continuous>  dextrose 50% Injectable 12.5 Gram(s) IV Push once  dextrose 50% Injectable 25 Gram(s) IV Push once  dextrose 50% Injectable 25 Gram(s) IV Push once  enoxaparin Injectable 40 milliGRAM(s) SubCutaneous daily  furosemide   Injectable 40 milliGRAM(s) IV Push daily  glucagon  Injectable 1 milliGRAM(s) IntraMuscular once PRN Glucose LESS THAN 70 milligrams/deciliter  guaiFENesin   Syrup  (Sugar-Free) 200 milliGRAM(s) Oral every 6 hours PRN Cough  influenza   Vaccine 0.5 milliLiter(s) IntraMuscular once  insulin glargine Injectable (LANTUS) 10 Unit(s) SubCutaneous at bedtime  insulin lispro (HumaLOG) corrective regimen sliding scale   SubCutaneous three times a day before meals  insulin lispro (HumaLOG) corrective regimen sliding scale   SubCutaneous at bedtime  insulin lispro Injectable (HumaLOG) 4 Unit(s) SubCutaneous three times a day before meals  lisinopril 10 milliGRAM(s) Oral daily  methylPREDNISolone sodium succinate Injectable 40 milliGRAM(s) IV Push daily  tiotropium 18 MICROgram(s) Capsule 1 Capsule(s) Inhalation daily                        PHYSICAL EXAM:    Vital Signs Last 24 Hrs  T(C): 37.2 (05 Mar 2020 05:14), Max: 37.2 (04 Mar 2020 22:30)  T(F): 99 (05 Mar 2020 05:14), Max: 99 (04 Mar 2020 22:30)  HR: 96 (05 Mar 2020 08:49) (78 - 96)  BP: 148/70 (05 Mar 2020 05:14) (133/65 - 158/60)  BP(mean): --  RR: 18 (05 Mar 2020 05:14) (15 - 20)  SpO2: 92% (05 Mar 2020 08:49) (92% - 100%)    GENERAL: NAD  NECK: Supple, No JVD  NERVOUS SYSTEM:  Alert & Oriented X3, non focal neuro exam.   CHEST/LUNG: clear lungs, No rales, rhonchi, wheezing, or rubs  HEART: Regular rate and rhythm; s1 and s2 auscultated, No murmurs, rubs, or gallops  ABDOMEN: Soft, Nontender, Nondistended; Bowel sounds present and normoactive.   EXTREMITIES:  2+ Peripheral Pulses, No clubbing, cyanosis, or edema  CATH SITE: Right wrist benign s/p cath.  No bleeding, no ecchymosis, no hematoma. Extremity Warm to touch, with palpable distal pulses, and brisk capillary refill.

## 2020-03-05 NOTE — PROGRESS NOTE ADULT - ASSESSMENT
59 year old male, PMHx of JADON, DM, and HTN, admitted for increased SOB and wheezing now s/p left heart catheterization with Dr. San. No cardiac interventions. Patient currently SOB/CP free, OOB ambulating without difficulty. Joe. diet. Right wrist benign s/p cath.  No bleeding, no ecchymosis, no hematoma. Extremity Warm to touch, with palpable distal pulses, and brisk capillary refill. Dressing removed.     Plan:  Nonobstructive CAD:   -s/p LHC with no cardiac intervention  -Continue ASA/Lisinopril/Coreg  -CP/SOB resolved/ambulating OOB/Joe. diet  -Diet/lifestyle modifications and medication compliance heavily reinforced   -cardiac rehab info provided/referral and communication to cardiac rehab completed  -Pt with stable vital signs, telemetry stable, physical exam unremarkable and unchanged from pre-procedural baseline, neurovascularly intact, ambulating, eating, review of systems completely negative. pt verbalized an understanding of the discharge teaching. Patient to follow up with Dr. San    Acute hypoxic respiratory failure 2/2 chronic systolic CHF:   -Euvolemic on exam  -Lungs clear/OOB without oxygen tolerating well.   -Continue IV lasix for LVEDP 34  -Continue to wean IV steroids 59 year old male, PMHx of JADON, DM, and HTN, admitted for increased SOB and wheezing now s/p left heart catheterization with Dr. San. No cardiac interventions. Patient currently SOB/CP free, OOB ambulating without difficulty. Joe. diet. Right wrist benign s/p cath.  No bleeding, no ecchymosis, no hematoma. Extremity Warm to touch, with palpable distal pulses, and brisk capillary refill. Dressing removed.       Nonobstructive CAD:   -s/p LHC with no cardiac intervention  -Continue ASA/Lisinopril/Coreg  -CP/SOB resolved/ambulating OOB/Joe. diet  -Diet/lifestyle modifications and medication compliance heavily reinforced   - patient not a candidate for cardiac rehab secondary to: no intervention     Acute hypoxic respiratory failure 2/2 chronic systolic CHF:   -Euvolemic on exam  -Lungs clear/OOB without oxygen tolerating well.   -Continue IV lasix for LVEDP 34  -Continue to wean IV steroids    Afib RVR   - new onset Afib RVR   - Chadsvasc 2   - rate control with metoprolol  - increase PO beta blocker  - start DOAC

## 2020-03-05 NOTE — PROGRESS NOTE ADULT - ASSESSMENT
The patient is a 59 year old male with a history of diabetes mellitus type 2, hypertension, JADON on CPAP and a family history of CAD who presented to the Er with complaints of dry cough, shortness of breath and difficulty breathing. Admitted to the ER for acute hypoxic respiratory failure initially requiring BIpap improved with IV lasix and solumedrol transitioned to nasal cannula. Chest xray showed cardiomegaly with clear lungs, BNP was 200 and cardiac enzymes were negative as well as influenza panel. Echcoardiogram with EF of 40-45%; cardiology consulted     Assessment/Plan:    1 Acute hypoxic respiratory failure suspect secondary to acute on chronic systolic CHF: SPo2 in the 60s on ambulation this morning  Improved on NC  LCH with normal coronaries   Continue IV lasix  Echocardiogram with EF of 40-45%; cardiology evaluation requested  Dc steroids and nebs     2. New onset afib with RVR: Started on Eliquis PO  Started on Coreg BID     3. JADON on nocturnal cpap    4. Diabetes mellitus type 2; Continue lantus   humalog 4 units TID with meals    5. Hypertension on lisinopril    VTE- lovenox subcut

## 2020-03-05 NOTE — PROGRESS NOTE ADULT - SUBJECTIVE AND OBJECTIVE BOX
CC: Afib     INTERVAL HPI/OVERNIGHT EVENTS: Patient seen and examined, rapid new onset afib this morning hr in the 120s requiring IV lopressor with complaints of palpitations.       Vital Signs Last 24 Hrs  T(C): 36.1 (05 Mar 2020 10:46), Max: 37.6 (05 Mar 2020 09:55)  T(F): 97 (05 Mar 2020 10:46), Max: 99.7 (05 Mar 2020 09:55)  HR: 112 (05 Mar 2020 10:57) (78 - 142)  BP: 150/82 (05 Mar 2020 10:57) (133/65 - 158/70)  BP(mean): --  RR: 18 (05 Mar 2020 09:55) (15 - 20)  SpO2: 94% (05 Mar 2020 10:57) (87% - 99%)    PHYSICAL EXAM:    GENERAL: NAD, AOX3 obese  HEAD:  Atraumatic, Normocephalic  EYES: EOMI, PERRLA, conjunctiva and sclera clear  ENMT: Moist mucous membranes  CHEST/LUNG: Clear to auscultation bilaterally; No rales, rhonchi, wheezing, or rubs  HEART:IRR; No murmurs, rubs, or gallops  ABDOMEN: Soft, Nontender, Nondistended; Bowel sounds present  EXTREMITIES:  2+ Peripheral Pulses, No clubbing, cyanosis, or edema        MEDICATIONS  (STANDING):  ALBUTerol    90 MICROgram(s) HFA Inhaler 1 Puff(s) Inhalation every 4 hours  apixaban 5 milliGRAM(s) Oral every 12 hours  aspirin enteric coated 81 milliGRAM(s) Oral daily  carvedilol 6.25 milliGRAM(s) Oral every 12 hours  cloNIDine 0.1 milliGRAM(s) Oral daily  dextrose 5%. 1000 milliLiter(s) (50 mL/Hr) IV Continuous <Continuous>  dextrose 50% Injectable 12.5 Gram(s) IV Push once  dextrose 50% Injectable 25 Gram(s) IV Push once  dextrose 50% Injectable 25 Gram(s) IV Push once  furosemide   Injectable 40 milliGRAM(s) IV Push daily  influenza   Vaccine 0.5 milliLiter(s) IntraMuscular once  insulin glargine Injectable (LANTUS) 10 Unit(s) SubCutaneous at bedtime  insulin lispro (HumaLOG) corrective regimen sliding scale   SubCutaneous three times a day before meals  insulin lispro (HumaLOG) corrective regimen sliding scale   SubCutaneous at bedtime  insulin lispro Injectable (HumaLOG) 4 Unit(s) SubCutaneous three times a day before meals  lisinopril 10 milliGRAM(s) Oral daily  tiotropium 18 MICROgram(s) Capsule 1 Capsule(s) Inhalation daily    MEDICATIONS  (PRN):  ALBUTerol    0.083% 2.5 milliGRAM(s) Nebulizer every 2 hours PRN Shortness of Breath and/or Wheezing  dextrose 40% Gel 15 Gram(s) Oral once PRN Blood Glucose LESS THAN 70 milliGRAM(s)/deciliter  glucagon  Injectable 1 milliGRAM(s) IntraMuscular once PRN Glucose LESS THAN 70 milligrams/deciliter  guaiFENesin   Syrup  (Sugar-Free) 200 milliGRAM(s) Oral every 6 hours PRN Cough  metoprolol tartrate Injectable 5 milliGRAM(s) IV Push every 6 hours PRN Sustained HR > 130      Allergies    No Known Allergies    Intolerances          LABS:                          15.4   10.38 )-----------( 260      ( 05 Mar 2020 11:19 )             46.6     03-04    139  |  99  |  25.0<H>  ----------------------------<  238<H>  4.6   |  26.0  |  1.16    Ca    9.3      04 Mar 2020 05:58        Urinalysis Basic - ( 04 Mar 2020 08:16 )    Color: Yellow / Appearance: Clear / S.020 / pH: x  Gluc: x / Ketone: Trace  / Bili: Negative / Urobili: Negative mg/dL   Blood: x / Protein: 30 mg/dL / Nitrite: Negative   Leuk Esterase: Negative / RBC: 0-2 /HPF / WBC 0-2   Sq Epi: x / Non Sq Epi: Occasional / Bacteria: Negative        RADIOLOGY & ADDITIONAL TESTS:

## 2020-03-05 NOTE — PROGRESS NOTE ADULT - ASSESSMENT
59 year old male, PMHx of JADON, DM, and HTN, admitted for increased SOB and wheezing now s/p left heart catheterization with Dr. San. No cardiac interventions. Patient currently SOB/CP free, OOB ambulating without difficulty. Joe. diet. Right wrist benign s/p cath.  No bleeding, no ecchymosis, no hematoma. Extremity Warm to touch, with palpable distal pulses, and brisk capillary refill. Dressing removed.       Nonobstructive CAD:   -s/p LHC with no cardiac intervention  -Continue ASA/Lisinopril/Coreg  -CP/SOB resolved/ambulating OOB/Joe. diet  -Diet/lifestyle modifications and medication compliance heavily reinforced   - patient not a candidate for cardiac rehab secondary to: no intervention     Acute hypoxic respiratory failure 2/2 chronic systolic CHF:   -Euvolemic on exam  -Lungs clear/OOB without oxygen tolerating well.   -Continue IV lasix for LVEDP 34  -Continue to wean IV steroids    Afib RVR   - new onset Afib RVR   - Chadsvasc 2   - s/p metoprolol IVP   - goal rate <115   - increase PO coreg   - start eliquis 5mg BID

## 2020-03-06 LAB
GLUCOSE BLDC GLUCOMTR-MCNC: 142 MG/DL — HIGH (ref 70–99)
GLUCOSE BLDC GLUCOMTR-MCNC: 147 MG/DL — HIGH (ref 70–99)
GLUCOSE BLDC GLUCOMTR-MCNC: 164 MG/DL — HIGH (ref 70–99)
GLUCOSE BLDC GLUCOMTR-MCNC: 208 MG/DL — HIGH (ref 70–99)

## 2020-03-06 PROCEDURE — 99233 SBSQ HOSP IP/OBS HIGH 50: CPT

## 2020-03-06 RX ORDER — CARVEDILOL PHOSPHATE 80 MG/1
12.5 CAPSULE, EXTENDED RELEASE ORAL EVERY 12 HOURS
Refills: 0 | Status: DISCONTINUED | OUTPATIENT
Start: 2020-03-06 | End: 2020-03-07

## 2020-03-06 RX ADMIN — APIXABAN 5 MILLIGRAM(S): 2.5 TABLET, FILM COATED ORAL at 05:16

## 2020-03-06 RX ADMIN — CARVEDILOL PHOSPHATE 12.5 MILLIGRAM(S): 80 CAPSULE, EXTENDED RELEASE ORAL at 17:39

## 2020-03-06 RX ADMIN — CARVEDILOL PHOSPHATE 6.25 MILLIGRAM(S): 80 CAPSULE, EXTENDED RELEASE ORAL at 05:17

## 2020-03-06 RX ADMIN — Medication 4 UNIT(S): at 12:48

## 2020-03-06 RX ADMIN — INSULIN GLARGINE 10 UNIT(S): 100 INJECTION, SOLUTION SUBCUTANEOUS at 22:20

## 2020-03-06 RX ADMIN — Medication 5 MILLIGRAM(S): at 14:49

## 2020-03-06 RX ADMIN — Medication 4: at 08:34

## 2020-03-06 RX ADMIN — Medication 0.1 MILLIGRAM(S): at 05:17

## 2020-03-06 RX ADMIN — Medication 4 UNIT(S): at 08:35

## 2020-03-06 RX ADMIN — Medication 4 UNIT(S): at 17:39

## 2020-03-06 RX ADMIN — Medication 81 MILLIGRAM(S): at 09:59

## 2020-03-06 RX ADMIN — Medication 40 MILLIGRAM(S): at 05:16

## 2020-03-06 RX ADMIN — APIXABAN 5 MILLIGRAM(S): 2.5 TABLET, FILM COATED ORAL at 17:50

## 2020-03-06 RX ADMIN — LISINOPRIL 10 MILLIGRAM(S): 2.5 TABLET ORAL at 05:17

## 2020-03-06 NOTE — PROGRESS NOTE ADULT - ASSESSMENT
59 year old male, PMHx of JADON, DM, and HTN, admitted for increased SOB and wheezing now s/p left heart catheterization with Dr. San. No cardiac interventions. Patient currently SOB/CP free, OOB ambulating without difficulty. Joe. diet. Right wrist benign s/p cath.  No bleeding, no ecchymosis, no hematoma. Extremity Warm to touch, with palpable distal pulses, and brisk capillary refill. Dressing removed.    Tele:  Aflutter 80's, earlier today was up to 140 afib.     Nonobstructive CAD:   -s/p LHC with no cardiac intervention  -Continue ASA/Lisinopril/Coreg  -CP/SOB resolved/ambulating OOB/Joe. diet  -Diet/lifestyle modifications and medication compliance heavily reinforced   - patient not a candidate for cardiac rehab secondary to: no intervention     Acute hypoxic respiratory failure 2/2 chronic systolic CHF:   -Euvolemic on exam  -Lungs clear/OOB without oxygen tolerating well.   -Continue IV lasix for LVEDP 34  -Continue to wean IV steroids    Afib RVR   - new onset Afib RVR   - Chadsvasc 2   - s/p metoprolol IVP prn  - goal rate <115   - increase as needed, PO coreg   - ct Eliquis 5mg BID 59 year old male, PMHx of JADON, DM, and HTN, admitted for increased SOB and wheezing now s/p left heart catheterization with Dr. San. No cardiac interventions. Patient currently SOB/CP free, OOB ambulating without difficulty. Joe. diet. Right wrist benign s/p cath.  No bleeding, no ecchymosis, no hematoma. Extremity Warm to touch, with palpable distal pulses, and brisk capillary refill. Dressing removed.    Tele:  Aflutter 80's, earlier today was up to 140 afib.    Plan is for cardioversion npo tonight past midnight.      Nonobstructive CAD:   -s/p LHC with no cardiac intervention  -Continue ASA/Lisinopril/Coreg  -CP/SOB resolved/ambulating OOB/Joe. diet  -Diet/lifestyle modifications and medication compliance heavily reinforced   - patient not a candidate for cardiac rehab secondary to: no intervention     Acute hypoxic respiratory failure 2/2 chronic systolic CHF:   -Euvolemic on exam  -Lungs clear/OOB without oxygen tolerating well.   -Continue IV lasix for LVEDP 34  -Continue to wean IV steroids    Afib RVR   - new onset Afib RVR   - Chadsvasc 2   - s/p metoprolol IVP prn  - goal rate <115   - increase as needed, PO coreg   - ct Eliquis 5mg BID 59 year old male, PMHx of JADON, DM, and HTN, admitted for increased SOB and wheezing now s/p left heart catheterization with Dr. San. No cardiac interventions. Patient currently SOB/CP free, OOB ambulating without difficulty. Joe. diet. Right wrist benign s/p cath.  No bleeding, no ecchymosis, no hematoma. Extremity Warm to touch, with palpable distal pulses, and brisk capillary refill. Dressing removed.    Tele:  Aflutter 80's, earlier today was up to 140 afib.    Plan is for cardioversion npo tonight past midnight.      Nonobstructive CAD:   -s/p LHC with no cardiac intervention  -Continue ASA/Lisinopril/Coreg  -CP/SOB resolved/ambulating OOB/Joe. diet  -Diet/lifestyle modifications and medication compliance heavily reinforced   - patient not a candidate for cardiac rehab secondary to: no intervention     Acute hypoxic respiratory failure 2/2 chronic systolic CHF:   -Euvolemic on exam  -Lungs clear/OOB without oxygen tolerating well.   -Continue IV lasix for LVEDP 34  -Continue to wean IV steroids    Afib RVR   - new onset Afib RVR   - Chadsvasc 2   - s/p metoprolol IVP prn  - goal rate <115   - increase as needed, PO coreg   - ct Eliquis 5mg BID  -Plan is for possible cardioversion on Monday npo Sunday past midnight.

## 2020-03-06 NOTE — PROGRESS NOTE ADULT - SUBJECTIVE AND OBJECTIVE BOX
[place] [facility]                                                        Office: [address] [address]                                                       Telephone: [phone]. Fax:[phone]                                                                             PROGRESS NOTE   Reason for follow up:  Afib RVR                            Overnight: No new events.   Update:   Rate changed to Atrial flutter rate in the 70's to 80.    Subjective: "I feel somewhat better"   Complains of:  nothing  Review of symptoms: Cardiac:  No chest pain. No dyspnea. No palpitations.  Respiratory: no cough. No dyspnea  Gastrointestinal: No diarrhea. No abdominal pain. No bleeding.     Past medical history: No updates.   Chronic conditions:  HEALTH ISSUES - PROBLEM Dx:   PMHx of JADON, DM, and HTN,   	  Vitals:  T(C): 36.7 (03-06-20 @ 15:30), Max: 36.9 (03-05-20 @ 20:55)  HR: 91 (03-06-20 @ 15:30) (76 - 130)  BP: 135/92 (03-06-20 @ 15:30) (127/95 - 146/62)  RR: 18 (03-06-20 @ 15:30) (17 - 20)  SpO2: 97% (03-06-20 @ 15:30) (94% - 99%)  I&O's Summary  05 Mar 2020 07:01  -  06 Mar 2020 07:00  --------------------------------------------------------  IN: 600 mL / OUT: 1720 mL / NET: -1120 mL  Weight (kg): 89.8 (03-02 @ 14:31)    PHYSICAL EXAM:  Appearance: Comfortable. No acute distress, obese male  HEENT:  Head and neck: Atraumatic. Normocephalic.  Normal oral mucosa, PERRL, Neck is supple. No JVD, No carotid bruit.   Neurologic: A & O x 3, no focal deficits. EOMI , Cranial nerves are intact.  Lymphatic: No cervical lymphadenopathy  Cardiovascular: Normal S1 S2, No murmur, rubs/gallops. No JVD, No edema  Respiratory: Lungs clear to auscultation  Gastrointestinal:  Soft, Non-tender, + BS  Lower Extremities: No edema  Psychiatry: Patient is calm. No agitation. Mood & affect appropriate  Skin: No rashes/ ecchymoses/cyanosis/ulcers visualized on the face, hands or feet.    CURRENT MEDICATIONS:  carvedilol 12.5 milliGRAM(s) Oral every 12 hours  cloNIDine 0.1 milliGRAM(s) Oral daily  furosemide   Injectable 40 milliGRAM(s) IV Push daily  lisinopril 10 milliGRAM(s) Oral daily  metoprolol tartrate Injectable 5 milliGRAM(s) IV Push every 6 hours PRN  ALBUTerol    90 MICROgram(s) HFA Inhaler  tiotropium 18 MICROgram(s) Capsule  insulin glargine Injectable (LANTUS)  insulin lispro (HumaLOG) corrective regimen sliding scale  insulin lispro (HumaLOG) corrective regimen sliding scale  insulin lispro Injectable (HumaLOG)  apixaban  aspirin enteric coated  dextrose 5%.  influenza   Vaccine    LABS:	 	                       15.4   10.38 )-----------( 260      ( 05 Mar 2020 11:19 )             46.6   03-05  140  |  98  |  28.0<H>  ----------------------------<  278<H>  4.2   |  27.0  |  1.10  Ca    8.9      05 Mar 2020 11:19  Mg     2.5     03-05  TPro  7.4  /  Alb  4.2  /  TBili  1.4  /  DBili  x   /  AST  25  /  ALT  66<H>  /  AlkPhos  69  03-05  proBNP: Serum Pro-Brain Natriuretic Peptide: 260 pg/mL (03-02 @ 16:26)  Lipid Profile: Date: 03-03 @ 07:24  Total cholesterol 118; Direct LDL: 49; HDL: 49; Triglycerides:99  HgA1c: Hemoglobin A1C, Whole Blood: 8.6 %    TELEMETRY: Reviewed  Afib RVR upto 150's, after 3pm rate lowered to aflutter 80's. [place] [facility]                                                        Office: [address] [address]                                                       Telephone: [phone]. Fax:[phone]                                                                             PROGRESS NOTE   Reason for follow up:  Afib RVR                            Overnight: No new events.   Update:   Rate changed to Atrial flutter rate in the 70's to 80.   Plan is for cardioversion npo tonight past midnight.    Subjective: "I feel somewhat better"   Complains of:  nothing  Review of symptoms: Cardiac:  No chest pain. No dyspnea. No palpitations.  Respiratory: no cough. No dyspnea  Gastrointestinal: No diarrhea. No abdominal pain. No bleeding.     Past medical history: No updates.   Chronic conditions:  HEALTH ISSUES - PROBLEM Dx:   PMHx of JADON, DM, and HTN,   	  Vitals:  T(C): 36.7 (03-06-20 @ 15:30), Max: 36.9 (03-05-20 @ 20:55)  HR: 91 (03-06-20 @ 15:30) (76 - 130)  BP: 135/92 (03-06-20 @ 15:30) (127/95 - 146/62)  RR: 18 (03-06-20 @ 15:30) (17 - 20)  SpO2: 97% (03-06-20 @ 15:30) (94% - 99%)  I&O's Summary  05 Mar 2020 07:01  -  06 Mar 2020 07:00  --------------------------------------------------------  IN: 600 mL / OUT: 1720 mL / NET: -1120 mL  Weight (kg): 89.8 (03-02 @ 14:31)    PHYSICAL EXAM:  Appearance: Comfortable. No acute distress, obese male  HEENT:  Head and neck: Atraumatic. Normocephalic.  Normal oral mucosa, PERRL, Neck is supple. No JVD, No carotid bruit.   Neurologic: A & O x 3, no focal deficits. EOMI , Cranial nerves are intact.  Lymphatic: No cervical lymphadenopathy  Cardiovascular: Normal S1 S2, No murmur, rubs/gallops. No JVD, No edema  Respiratory: Lungs clear to auscultation  Gastrointestinal:  Soft, Non-tender, + BS  Lower Extremities: No edema  Psychiatry: Patient is calm. No agitation. Mood & affect appropriate  Skin: No rashes/ ecchymoses/cyanosis/ulcers visualized on the face, hands or feet.    CURRENT MEDICATIONS:  carvedilol 12.5 milliGRAM(s) Oral every 12 hours  cloNIDine 0.1 milliGRAM(s) Oral daily  furosemide   Injectable 40 milliGRAM(s) IV Push daily  lisinopril 10 milliGRAM(s) Oral daily  metoprolol tartrate Injectable 5 milliGRAM(s) IV Push every 6 hours PRN  ALBUTerol    90 MICROgram(s) HFA Inhaler  tiotropium 18 MICROgram(s) Capsule  insulin glargine Injectable (LANTUS)  insulin lispro (HumaLOG) corrective regimen sliding scale  insulin lispro (HumaLOG) corrective regimen sliding scale  insulin lispro Injectable (HumaLOG)  apixaban  aspirin enteric coated  dextrose 5%.  influenza   Vaccine    LABS:	 	                       15.4   10.38 )-----------( 260      ( 05 Mar 2020 11:19 )             46.6   03-05  140  |  98  |  28.0<H>  ----------------------------<  278<H>  4.2   |  27.0  |  1.10  Ca    8.9      05 Mar 2020 11:19  Mg     2.5     03-05  TPro  7.4  /  Alb  4.2  /  TBili  1.4  /  DBili  x   /  AST  25  /  ALT  66<H>  /  AlkPhos  69  03-05  proBNP: Serum Pro-Brain Natriuretic Peptide: 260 pg/mL (03-02 @ 16:26)  Lipid Profile: Date: 03-03 @ 07:24  Total cholesterol 118; Direct LDL: 49; HDL: 49; Triglycerides:99  HgA1c: Hemoglobin A1C, Whole Blood: 8.6 %    TELEMETRY: Reviewed  Afib RVR upto 150's, after 3pm rate lowered to aflutter 80's. [place] [facility]                                                        Office: [address] [address]                                                       Telephone: [phone]. Fax:[phone]                                                                             PROGRESS NOTE   Reason for follow up:  Afib RVR                            Overnight: No new events.   Update:   Rate changed to Atrial flutter rate in the 70's to 80.   Plan is for possible cardioversion on monday, npo sunday past midnight.    Subjective: "I feel somewhat better"   Complains of:  nothing  Review of symptoms: Cardiac:  No chest pain. No dyspnea. No palpitations.  Respiratory: no cough. No dyspnea  Gastrointestinal: No diarrhea. No abdominal pain. No bleeding.     Past medical history: No updates.   Chronic conditions:  HEALTH ISSUES - PROBLEM Dx:   PMHx of JADON, DM, and HTN,   	  Vitals:  T(C): 36.7 (03-06-20 @ 15:30), Max: 36.9 (03-05-20 @ 20:55)  HR: 91 (03-06-20 @ 15:30) (76 - 130)  BP: 135/92 (03-06-20 @ 15:30) (127/95 - 146/62)  RR: 18 (03-06-20 @ 15:30) (17 - 20)  SpO2: 97% (03-06-20 @ 15:30) (94% - 99%)  I&O's Summary  05 Mar 2020 07:01  -  06 Mar 2020 07:00  --------------------------------------------------------  IN: 600 mL / OUT: 1720 mL / NET: -1120 mL  Weight (kg): 89.8 (03-02 @ 14:31)    PHYSICAL EXAM:  Appearance: Comfortable. No acute distress, obese male  HEENT:  Head and neck: Atraumatic. Normocephalic.  Normal oral mucosa, PERRL, Neck is supple. No JVD, No carotid bruit.   Neurologic: A & O x 3, no focal deficits. EOMI , Cranial nerves are intact.  Lymphatic: No cervical lymphadenopathy  Cardiovascular: Normal S1 S2, No murmur, rubs/gallops. No JVD, No edema  Respiratory: Lungs clear to auscultation  Gastrointestinal:  Soft, Non-tender, + BS  Lower Extremities: No edema  Psychiatry: Patient is calm. No agitation. Mood & affect appropriate  Skin: No rashes/ ecchymoses/cyanosis/ulcers visualized on the face, hands or feet.    CURRENT MEDICATIONS:  carvedilol 12.5 milliGRAM(s) Oral every 12 hours  cloNIDine 0.1 milliGRAM(s) Oral daily  furosemide   Injectable 40 milliGRAM(s) IV Push daily  lisinopril 10 milliGRAM(s) Oral daily  metoprolol tartrate Injectable 5 milliGRAM(s) IV Push every 6 hours PRN  ALBUTerol    90 MICROgram(s) HFA Inhaler  tiotropium 18 MICROgram(s) Capsule  insulin glargine Injectable (LANTUS)  insulin lispro (HumaLOG) corrective regimen sliding scale  insulin lispro (HumaLOG) corrective regimen sliding scale  insulin lispro Injectable (HumaLOG)  apixaban  aspirin enteric coated  dextrose 5%.  influenza   Vaccine    LABS:	 	                       15.4   10.38 )-----------( 260      ( 05 Mar 2020 11:19 )             46.6   03-05  140  |  98  |  28.0<H>  ----------------------------<  278<H>  4.2   |  27.0  |  1.10  Ca    8.9      05 Mar 2020 11:19  Mg     2.5     03-05  TPro  7.4  /  Alb  4.2  /  TBili  1.4  /  DBili  x   /  AST  25  /  ALT  66<H>  /  AlkPhos  69  03-05  proBNP: Serum Pro-Brain Natriuretic Peptide: 260 pg/mL (03-02 @ 16:26)  Lipid Profile: Date: 03-03 @ 07:24  Total cholesterol 118; Direct LDL: 49; HDL: 49; Triglycerides:99  HgA1c: Hemoglobin A1C, Whole Blood: 8.6 %    TELEMETRY: Reviewed  Afib RVR upto 150's, after 3pm rate lowered to aflutter 80's.

## 2020-03-06 NOTE — PROGRESS NOTE ADULT - SUBJECTIVE AND OBJECTIVE BOX
CC: Afib with rvr     INTERVAL HPI/OVERNIGHT EVENTS: Patient seen and examined, feels better today. SOB improving. Denies chest pain. HR in the 120s today.       Vital Signs Last 24 Hrs  T(C): 36.4 (06 Mar 2020 10:19), Max: 36.9 (05 Mar 2020 20:55)  T(F): 97.5 (06 Mar 2020 10:19), Max: 98.4 (05 Mar 2020 20:55)  HR: 130 (06 Mar 2020 14:47) (76 - 130)  BP: 127/95 (06 Mar 2020 14:47) (127/95 - 146/62)  BP(mean): --  RR: 17 (06 Mar 2020 06:43) (17 - 20)  SpO2: 99% (06 Mar 2020 10:19) (94% - 99%)    PHYSICAL EXAM:    GENERAL: NAD, AOX3  HEAD:  Atraumatic, Normocephalic  EYES: EOMI, PERRLA, conjunctiva and sclera clear  ENMT: Moist mucous membranes  CHEST/LUNG: Clear to auscultation bilaterally; No rales, rhonchi, wheezing, or rubs  HEART: IRR; No murmurs, rubs, or gallops  ABDOMEN: Soft, Nontender, Nondistended; Bowel sounds present  EXTREMITIES:  2+ Peripheral Pulses, No clubbing, cyanosis, or edema        MEDICATIONS  (STANDING):  ALBUTerol    90 MICROgram(s) HFA Inhaler 1 Puff(s) Inhalation every 4 hours  apixaban 5 milliGRAM(s) Oral every 12 hours  aspirin enteric coated 81 milliGRAM(s) Oral daily  carvedilol 12.5 milliGRAM(s) Oral every 12 hours  cloNIDine 0.1 milliGRAM(s) Oral daily  dextrose 5%. 1000 milliLiter(s) (50 mL/Hr) IV Continuous <Continuous>  dextrose 50% Injectable 12.5 Gram(s) IV Push once  dextrose 50% Injectable 25 Gram(s) IV Push once  dextrose 50% Injectable 25 Gram(s) IV Push once  furosemide   Injectable 40 milliGRAM(s) IV Push daily  influenza   Vaccine 0.5 milliLiter(s) IntraMuscular once  insulin glargine Injectable (LANTUS) 10 Unit(s) SubCutaneous at bedtime  insulin lispro (HumaLOG) corrective regimen sliding scale   SubCutaneous three times a day before meals  insulin lispro (HumaLOG) corrective regimen sliding scale   SubCutaneous at bedtime  insulin lispro Injectable (HumaLOG) 4 Unit(s) SubCutaneous three times a day before meals  lisinopril 10 milliGRAM(s) Oral daily  tiotropium 18 MICROgram(s) Capsule 1 Capsule(s) Inhalation daily    MEDICATIONS  (PRN):  ALBUTerol    0.083% 2.5 milliGRAM(s) Nebulizer every 2 hours PRN Shortness of Breath and/or Wheezing  dextrose 40% Gel 15 Gram(s) Oral once PRN Blood Glucose LESS THAN 70 milliGRAM(s)/deciliter  glucagon  Injectable 1 milliGRAM(s) IntraMuscular once PRN Glucose LESS THAN 70 milligrams/deciliter  guaiFENesin   Syrup  (Sugar-Free) 200 milliGRAM(s) Oral every 6 hours PRN Cough  metoprolol tartrate Injectable 5 milliGRAM(s) IV Push every 6 hours PRN Sustained HR > 130      Allergies    No Known Allergies    Intolerances          LABS:                          15.4   10.38 )-----------( 260      ( 05 Mar 2020 11:19 )             46.6     03-05    140  |  98  |  28.0<H>  ----------------------------<  278<H>  4.2   |  27.0  |  1.10    Ca    8.9      05 Mar 2020 11:19  Mg     2.5     03-05    TPro  7.4  /  Alb  4.2  /  TBili  1.4  /  DBili  x   /  AST  25  /  ALT  66<H>  /  AlkPhos  69  03-05          RADIOLOGY & ADDITIONAL TESTS:

## 2020-03-06 NOTE — PROGRESS NOTE ADULT - ASSESSMENT
The patient is a 59 year old male with a history of diabetes mellitus type 2, hypertension, JADON on CPAP and a family history of CAD who presented to the Er with complaints of dry cough, shortness of breath and difficulty breathing. Admitted to the ER for acute hypoxic respiratory failure initially requiring BiPAP improved with IV lasix and solumedrol transitioned to nasal cannula. Chest xray showed cardiomegaly with clear lungs, BNP was 200 and cardiac enzymes were negative as well as influenza panel. Echocardiogram with EF of 40-45%; cardiology consulted. Status post cath with normal coronaries and low EF. Course complicated by new onset afib with rvr; started on coreg and Eliquis     Assessment/Plan:    1 Acute hypoxic respiratory failure suspect secondary to acute on chronic systolic CHF: SPo2 in the 60s on ambulation this morning  Improved on NC  LCH with normal coronaries   Continue IV lasix  Echocardiogram with EF of 40-45%    2. New onset afib with RVR: Started on Eliquis PO  Increase dose of coreg      3. JADON on nocturnal cpap    4. Diabetes mellitus type 2; Continue lantus   humalog 4 units TID with meals    5. Hypertension on lisinopril    VTE- lovenox subcut

## 2020-03-07 LAB
ANION GAP SERPL CALC-SCNC: 13 MMOL/L — SIGNIFICANT CHANGE UP (ref 5–17)
BUN SERPL-MCNC: 30 MG/DL — HIGH (ref 8–20)
CALCIUM SERPL-MCNC: 9.6 MG/DL — SIGNIFICANT CHANGE UP (ref 8.6–10.2)
CHLORIDE SERPL-SCNC: 99 MMOL/L — SIGNIFICANT CHANGE UP (ref 98–107)
CO2 SERPL-SCNC: 30 MMOL/L — HIGH (ref 22–29)
CREAT SERPL-MCNC: 1.31 MG/DL — HIGH (ref 0.5–1.3)
GLUCOSE BLDC GLUCOMTR-MCNC: 146 MG/DL — HIGH (ref 70–99)
GLUCOSE BLDC GLUCOMTR-MCNC: 161 MG/DL — HIGH (ref 70–99)
GLUCOSE BLDC GLUCOMTR-MCNC: 176 MG/DL — HIGH (ref 70–99)
GLUCOSE BLDC GLUCOMTR-MCNC: 178 MG/DL — HIGH (ref 70–99)
GLUCOSE SERPL-MCNC: 165 MG/DL — HIGH (ref 70–99)
MAGNESIUM SERPL-MCNC: 2.4 MG/DL — SIGNIFICANT CHANGE UP (ref 1.8–2.6)
POTASSIUM SERPL-MCNC: 4 MMOL/L — SIGNIFICANT CHANGE UP (ref 3.5–5.3)
POTASSIUM SERPL-SCNC: 4 MMOL/L — SIGNIFICANT CHANGE UP (ref 3.5–5.3)
SODIUM SERPL-SCNC: 142 MMOL/L — SIGNIFICANT CHANGE UP (ref 135–145)

## 2020-03-07 PROCEDURE — 99232 SBSQ HOSP IP/OBS MODERATE 35: CPT

## 2020-03-07 RX ORDER — METOPROLOL TARTRATE 50 MG
25 TABLET ORAL EVERY 8 HOURS
Refills: 0 | Status: DISCONTINUED | OUTPATIENT
Start: 2020-03-07 | End: 2020-03-07

## 2020-03-07 RX ORDER — INSULIN GLARGINE 100 [IU]/ML
12 INJECTION, SOLUTION SUBCUTANEOUS AT BEDTIME
Refills: 0 | Status: DISCONTINUED | OUTPATIENT
Start: 2020-03-07 | End: 2020-03-09

## 2020-03-07 RX ORDER — METOPROLOL TARTRATE 50 MG
50 TABLET ORAL EVERY 6 HOURS
Refills: 0 | Status: DISCONTINUED | OUTPATIENT
Start: 2020-03-07 | End: 2020-03-09

## 2020-03-07 RX ADMIN — LISINOPRIL 10 MILLIGRAM(S): 2.5 TABLET ORAL at 06:53

## 2020-03-07 RX ADMIN — CARVEDILOL PHOSPHATE 12.5 MILLIGRAM(S): 80 CAPSULE, EXTENDED RELEASE ORAL at 06:53

## 2020-03-07 RX ADMIN — Medication 40 MILLIGRAM(S): at 06:53

## 2020-03-07 RX ADMIN — Medication 0.1 MILLIGRAM(S): at 06:53

## 2020-03-07 RX ADMIN — Medication 4 UNIT(S): at 12:19

## 2020-03-07 RX ADMIN — Medication 2: at 09:03

## 2020-03-07 RX ADMIN — Medication 50 MILLIGRAM(S): at 15:17

## 2020-03-07 RX ADMIN — APIXABAN 5 MILLIGRAM(S): 2.5 TABLET, FILM COATED ORAL at 17:31

## 2020-03-07 RX ADMIN — Medication 2: at 12:19

## 2020-03-07 RX ADMIN — Medication 5 MILLIGRAM(S): at 15:17

## 2020-03-07 RX ADMIN — Medication 5 MILLIGRAM(S): at 12:01

## 2020-03-07 RX ADMIN — Medication 81 MILLIGRAM(S): at 09:03

## 2020-03-07 RX ADMIN — Medication 4 UNIT(S): at 17:31

## 2020-03-07 RX ADMIN — INSULIN GLARGINE 12 UNIT(S): 100 INJECTION, SOLUTION SUBCUTANEOUS at 22:09

## 2020-03-07 RX ADMIN — APIXABAN 5 MILLIGRAM(S): 2.5 TABLET, FILM COATED ORAL at 06:53

## 2020-03-07 NOTE — PROGRESS NOTE ADULT - SUBJECTIVE AND OBJECTIVE BOX
[place] [facility]  [address] [id number]    REASON FOR VISIT:  Follow up on arrhythmia  UPDATE:  Hr still fast  TELEMETRY MONITORING:  -150's    03-07    142  |  99  |  30.0<H>  ----------------------------<  165<H>  4.0   |  30.0<H>  |  1.31<H>    Ca    9.6      07 Mar 2020 06:08  Mg     2.4     03-07      MEDICATIONS  (STANDING):  ALBUTerol    90 MICROgram(s) HFA Inhaler 1 Puff(s) Inhalation every 4 hours  apixaban 5 milliGRAM(s) Oral every 12 hours  aspirin enteric coated 81 milliGRAM(s) Oral daily  carvedilol 12.5 milliGRAM(s) Oral every 12 hours  cloNIDine 0.1 milliGRAM(s) Oral daily  furosemide   Injectable 40 milliGRAM(s) IV Push daily  influenza   Vaccine 0.5 milliLiter(s) IntraMuscular once  insulin glargine Injectable (LANTUS) 12 Unit(s) SubCutaneous at bedtime  insulin lispro (HumaLOG) corrective regimen sliding scale   SubCutaneous three times a day before meals  insulin lispro (HumaLOG) corrective regimen sliding scale   SubCutaneous at bedtime  insulin lispro Injectable (HumaLOG) 4 Unit(s) SubCutaneous three times a day before meals  lisinopril 10 milliGRAM(s) Oral daily  tiotropium 18 MICROgram(s) Capsule 1 Capsule(s) Inhalation daily    ROS:    No fever chills  Cardiac  No cp sob or palp  Resp  no cough no mucus production  Gi  no abd pain no melana  Ext No calf tenderness, trace edema    Vital Signs Last 24 Hrs  T(C): 36.8 (07 Mar 2020 09:35), Max: 36.8 (07 Mar 2020 06:58)  T(F): 98.2 (07 Mar 2020 09:35), Max: 98.2 (07 Mar 2020 06:58)  HR: 133 (07 Mar 2020 11:54) (77 - 150)  BP: 135/93 (07 Mar 2020 11:54) (125/84 - 173/104)  BP(mean): --  RR: 20 (07 Mar 2020 09:35) (18 - 20)  SpO2: 100% (07 Mar 2020 09:35) (97% - 100%)  T(C): 36.8 (03-07-20 @ 09:35), Max: 36.8 (03-07-20 @ 06:58)  HR: 133 (03-07-20 @ 11:54) (77 - 150)  BP: 135/93 (03-07-20 @ 11:54) (125/84 - 173/104)  RR: 20 (03-07-20 @ 09:35) (18 - 20)  SpO2: 100% (03-07-20 @ 09:35) (97% - 100%)    HEENT Head atraumatic eomi, oral mucosa moist  CV S1&S2  irregular  RESP  CTA  GI  Soft active bowel sounds non tender  EXT  No clubbing/Cyanosis /Edema  NEURO  Alert oriented  No gross motor or sensory deficits

## 2020-03-07 NOTE — PROGRESS NOTE ADULT - SUBJECTIVE AND OBJECTIVE BOX
HOSPITALIST PROGRESS NOTE    GAL COREAS  [id number]  59yMale    Patient is a 59y old  Male who presents with a chief complaint of cough/ wheeze (07 Mar 2020 13:27)      SUBJECTIVE:   Chart reviewed since last visit.  Patient seen and examined at bedside for hypoxia and AF with RVR  Denies any dizziness, chest pain or dyspnea. Occasional palpitations  Denies any nausea, vomiting, abdominal pain      OBJECTIVE:  Vital Signs Last 24 Hrs  T(C): 36.5 (07 Mar 2020 15:37), Max: 36.8 (07 Mar 2020 06:58)  T(F): 97.7 (07 Mar 2020 15:37), Max: 98.2 (07 Mar 2020 06:58)  HR: 111 (07 Mar 2020 15:09) (77 - 133)  BP: 122/77 (07 Mar 2020 15:09) (122/77 - 152/86)   RR: 20 (07 Mar 2020 09:35) (18 - 20)  SpO2: 100% (07 Mar 2020 09:35) (97% - 100%)    PHYSICAL EXAMINATION  General: Lying in bed, NAD  HEENT:  EOMI  NECK:  No thyromegaly  CVS: regular rate and rhythm S1 S2  RESP:  CTAB  GI:  Soft nondistended nontender BS+  : No suprapubic tenderness  MSK:  FROM, edema+/-  CNS:  No gross focal or global deficit noted  INTEG:  warm dry skin  PSYCH:  Fair mood    MONITOR:  CAPILLARY BLOOD GLUCOSE      POCT Blood Glucose.: 146 mg/dL (07 Mar 2020 17:23)  POCT Blood Glucose.: 176 mg/dL (07 Mar 2020 12:08)  POCT Blood Glucose.: 178 mg/dL (07 Mar 2020 08:07)  POCT Blood Glucose.: 164 mg/dL (06 Mar 2020 21:32)    Hemoglobin A1C, Whole Blood: 8.6 % (03.03.20 @ 07:24)        I&O's Summary    06 Mar 2020 07:01  -  07 Mar 2020 07:00  --------------------------------------------------------  IN: 120 mL / OUT: 1400 mL / NET: -1280 mL    07 Mar 2020 06:01  -  07 Mar 2020 19:47  --------------------------------------------------------  IN: 500 mL / OUT: 850 mL / NET: -350 mL              142  |  99  |  30.0<H>  ----------------------------<  165<H>  4.0   |  30.0<H>  |  1.31<H>    Ca    9.6      07 Mar 2020 06:08  Mg     2.4     07    Serum Pro-Brain Natriuretic Peptide: 260: NT-proBNP Interpretive comments:     Lipid Profile in AM (20 @ 07:24)    Total Cholesterol/HDL Ratio Measurement: 2.0 Ratio    Cholesterol, Serum: 118 mg/dL    Triglycerides, Serum: 99 mg/dL    HDL Cholesterol, Serum: 49: HDL Levels >/= 60 mg/dL are considered beneficial and a "negative" risk  factor.  Effective 08/15/2018: New reference range and interpretive comment. mg/dL    Direct LDL: 49: LDL Cholesterol (mg/dL) --- Interpretive Comment (for adults 18 and over)       Culture:    TTE:  < from: TTE Echo Complete w/o contrast w/ Doppler (20 @ 09:39) >    EXAM:  ECHO TTE WO CON COMP W DOPP      PROCEDURE DATE:  Mar  3 2020   .      INTERPRETATION:  REPORT:    TRANSTHORACIC ECHOCARDIOGRAM REPORT         Patient Name:   GAL COREAS Patient Location: [facility]  Medical Rec #:  [id number]   Accession #:  [id number]  Account #:                 Height:           72.8 in 185.0 cm  YOB: 1960  Weight:           196.2 lb 89.00 kg  Patient Age:    59 years   BSA:              2.13 m²  Patient Gender: M          BP:               151/96 mmHg       Date of Exam:        3/3/2020 9:39:18 AM  Sonographer:         Clementine Ugalde  Referring Physician: Nely Cooper MD    Procedure:   2D Echo/Doppler/Color Doppler Complete.  Indications: Dyspnea, unspecified - R06.00  Diagnosis:   Dyspnea, unspecified - R06.00         2D AND M-MODE MEASUREMENTS (normal ranges within parentheses):  Left                 Normal   Aorta/Left            Normal  Ventricle:                    Atrium:  IVSd (2D):    1.23  (0.7-1.1) Aortic Root  3.59 cm (2.4-3.7)            cm             (2D):  LVPWd (2D):   1.11  (0.7-1.1) Left Atrium  5.44 cm (1.9-4.0)                 cm             (2D):  LVIDd (2D):   6.26  (3.4-5.7) LA Volume     60.1                 cm             Index         ml/m²  LVIDs (2D):   4.91                 cm  LV FS (2D):   21.6   (>25%)                  %  LV EF (2D):   43 %   (>55%)  Relative Wall 0.36   (<0.42)  Thickness    LV SYSTOLIC FUNCTION BY 2D PLANIMETRY (MOD):  EF-A4C View: 46.6 % EF-A2C View: 36.4 % EF-Biplane: 43 %    LV DIASTOLIC FUNCTION:  MV Peak E: 1.11 m/s e', MV Lori: 0.09 m/s  MV Peak A: 0.31 m/s E/e' Ratio: 12.51  E/A Ratio: 3.54    SPECTRAL DOPPLER ANALYSIS (where applicable):  Aortic Valve: AoV Max Jayy: 1.30 m/s AoV Peak P.7 mmHg AoV Mean PG: 3.3 mmHg    LVOT Vmax: 1.01 m/s LVOT VTI: 0.190 m LVOT Diameter: 2.62 cm    AoV Area, Vmax: 4.20 cm² AoV Area, VTI: 3.98 cm² AoV Area, Vmn: 4.49 cm²  Ao VTI: 0.257  Aortic Insufficiency:  AI Half-time:  360 msec  AI Decel Rate: 2.59 m/s²            PHYSICIAN INTERPRETATION:  Left Ventricle: Endocardial visualization was enhanced with intravenous echo contrast. The left ventricular internal cavity size is normal.  Global LV systolic function was moderately decreased. Left ventricular ejection fraction, by visual estimation, is 40 to 45%. The left ventricular diastolic function could not be assessed in this study.  Right Ventricle: The right ventricular size is normal. RV systolic function is normal.  Left Atrium: Severely enlarged left atrium.  Right Atrium: Mildly enlarged right atrium.  Pericardium: There is no evidence of pericardial effusion.  Mitral Valve: The mitral valve is normal in structure. Mild thickening of the anterior and posterior mitral valve leaflets. There is mild mitral annular calcification. No evidence of mitral stenosis. Mild to moderate mitral valve regurgitation is seen. The MR jet is centrally-directed.  Tricuspid Valve: Structurally normal tricuspid valve, with normal leaflet excursion. Mild tricuspid regurgitation is visualized. Adequate TR velocity was not obtained to accurately assess RVSP.  Aortic Valve: The aortic valve is trileaflet. No evidence of aortic stenosis. Sclerotic aortic valve with normal opening. Peak transaortic gradient equals 6.7 mmHg, mean transaortic gradient equals 3.3 mmHg, the calculated aortic valve area equals 3.98 cm² by the continuity equation consistent with normally opening aortic valve. Moderate aortic valve regurgitation is seen.  Pulmonic Valve: Trace pulmonic valve regurgitation.  Aorta: The aortic root is normal in size and structure.  Venous: The inferior vena cava was dilated, with respiratory size variation greater than 50%.       Summary:   1. Endocardial visualization was enhanced with intravenous echo contrast.   2. Left ventricular ejection fraction, by visual estimation, is 40 to 45%.   3. Moderately decreased global left ventricular systolic function.   4. The left ventricular diastolic function could not be assessed in this study.   5. Normal left ventricular internal cavity size.   6. There is mild concentric left ventricular hypertrophy.   7. Severely enlarged left atrium.   8. Mildly enlarged right atrium.   9. Mild mitral annular calcification.  10. Mild thickening of the anterior and posterior mitral valve leaflets.  11. Mild to moderate mitral valve regurgitation.  12. Sclerotic aortic valve with normal opening.  13. Moderate aortic regurgitation.  14. There is no evidence of pericardial effusion.    MD Simeon Electronically signed on 3/3/2020 at 11:13:06 AM              *** Final ***                  ANA ROSA CALDWELL   This document has been electronically signed. Mar  3 2020  9:39AM    < end of copied text >        RADIOLOGY  < from: Xray Chest 1 View-PORTABLE IMMEDIATE (20 @ 16:15) >     EXAM:  XR CHEST PORTABLE IMMED 1V                          PROCEDURE DATE:  2020          INTERPRETATION:  XR CHEST IMMEDIATE    Single AP view    HISTORY:  Chest Pain    Comparison: none.      The cardiac silhouette is enlarged. The lungs are clear. No pleural abnormality.    IMPRESSION: Cardiomegaly with clear lungs.                MICHI MELÉNDEZ M.D., ATTENDING RADIOLOGIST  This document has been electronically signed. Mar  2 2020  4:22PM        < end of copied text >        MEDICATIONS  (STANDING):  ALBUTerol    90 MICROgram(s) HFA Inhaler 1 Puff(s) Inhalation every 4 hours  apixaban 5 milliGRAM(s) Oral every 12 hours  aspirin enteric coated 81 milliGRAM(s) Oral daily  cloNIDine 0.1 milliGRAM(s) Oral daily  dextrose 5%. 1000 milliLiter(s) (50 mL/Hr) IV Continuous <Continuous>  dextrose 50% Injectable 12.5 Gram(s) IV Push once  dextrose 50% Injectable 25 Gram(s) IV Push once  dextrose 50% Injectable 25 Gram(s) IV Push once  furosemide   Injectable 40 milliGRAM(s) IV Push daily  influenza   Vaccine 0.5 milliLiter(s) IntraMuscular once  insulin glargine Injectable (LANTUS) 12 Unit(s) SubCutaneous at bedtime  insulin lispro (HumaLOG) corrective regimen sliding scale   SubCutaneous three times a day before meals  insulin lispro (HumaLOG) corrective regimen sliding scale   SubCutaneous at bedtime  insulin lispro Injectable (HumaLOG) 4 Unit(s) SubCutaneous three times a day before meals  lisinopril 10 milliGRAM(s) Oral daily  metoprolol tartrate 50 milliGRAM(s) Oral every 6 hours  tiotropium 18 MICROgram(s) Capsule 1 Capsule(s) Inhalation daily      MEDICATIONS  (PRN):  ALBUTerol    0.083% 2.5 milliGRAM(s) Nebulizer every 2 hours PRN Shortness of Breath and/or Wheezing  dextrose 40% Gel 15 Gram(s) Oral once PRN Blood Glucose LESS THAN 70 milliGRAM(s)/deciliter  glucagon  Injectable 1 milliGRAM(s) IntraMuscular once PRN Glucose LESS THAN 70 milligrams/deciliter  guaiFENesin   Syrup  (Sugar-Free) 200 milliGRAM(s) Oral every 6 hours PRN Cough  metoprolol tartrate Injectable 5 milliGRAM(s) IV Push every 6 hours PRN Sustained HR > 130

## 2020-03-07 NOTE — PROGRESS NOTE ADULT - ASSESSMENT
59 year old male with PMH HTN, Dyslipidemia, T2DM and JADON presented with cough and wheezing and initially placed on BiPAP in ER. He was admitted for further management and evaluation.   Chest X-Ray showed Cardiomegaly, Troponin <0.02 x 2,  and Flu A, B RSV negative.    CHFrEF. Appears to have improved since admisssion. TTE with EF 40-45%, mild to moderate MR and moderate AR. Underwent cardiac catheterization on 3/4 which showed non obstructive CAD. Also noted to have AF with RVR  - Continue Lasix IV daily  - Continue BB, ACEI    AF with RVR. CHADS 2  - Continue BB, Eliquis  - Plan for DCCV if remains in AF    DMT2 - almost normoglycemic on current regimen. A1c 8.6  - Continue BGM with SSI  - Continue Lantus (increased to 12U) and Humalog 4U    HTN - controlled on current regimen  - Continue BB, ACEI    Dyslipidemia - stable on diet. LDL 49  - Continue same  - Continue Statin    Prophylactic measure  - VTEp on DOAC 59 year old male with PMH HTN, Dyslipidemia, T2DM and JADON presented with cough and wheezing and initially placed on BiPAP in ER. He was admitted for further management and evaluation.   Chest X-Ray showed Cardiomegaly, Troponin <0.02 x 2,  and Flu A, B RSV negative.    CHFrEF. Appears to have improved since admisssion. TTE with EF 40-45%, mild to moderate MR and moderate AR. Underwent cardiac catheterization on 3/4 which showed non obstructive CAD. Also noted to have AF with RVR  - Continue Lasix IV daily  - Continue BB, ACEI    AF with RVR. CHADS 2  - Continue BB, Eliquis  - Plan for DCCV if remains in AF    DMT2 - almost normoglycemic on current regimen. A1c 8.6  - Continue BGM with SSI  - Continue Lantus (increased to 12U) and Humalog 4U    HTN - controlled on current regimen  - Continue BB, ACEI    Dyslipidemia - stable on diet. LDL 49  - Continue Statin    Prophylactic measure  - VTEp on DOAC

## 2020-03-07 NOTE — PROGRESS NOTE ADULT - ASSESSMENT
This is a 60 y/o male with PMH of htn, HLD who presented with acute heart failure, EF on echo 45% Cath showed non obstructive cAD       Afib RVR new onset  conrad vasc 2  d/c coreg and start metoprolol for rate control  c/w eilquis  Plan is for possible cardioversion on Monday npo Sunday past midnight.      Decompensated heart failure/mildy reduced ef 45%  beta blocker  Approaching euvolemia will change to po lasix  -Euvolemic on exam  -Continue IV lasix for LVEDP 34  I&Os

## 2020-03-08 LAB
ANION GAP SERPL CALC-SCNC: 10 MMOL/L — SIGNIFICANT CHANGE UP (ref 5–17)
BUN SERPL-MCNC: 27 MG/DL — HIGH (ref 8–20)
CALCIUM SERPL-MCNC: 9.5 MG/DL — SIGNIFICANT CHANGE UP (ref 8.6–10.2)
CHLORIDE SERPL-SCNC: 99 MMOL/L — SIGNIFICANT CHANGE UP (ref 98–107)
CO2 SERPL-SCNC: 31 MMOL/L — HIGH (ref 22–29)
CREAT SERPL-MCNC: 1.46 MG/DL — HIGH (ref 0.5–1.3)
GLUCOSE BLDC GLUCOMTR-MCNC: 115 MG/DL — HIGH (ref 70–99)
GLUCOSE BLDC GLUCOMTR-MCNC: 152 MG/DL — HIGH (ref 70–99)
GLUCOSE BLDC GLUCOMTR-MCNC: 155 MG/DL — HIGH (ref 70–99)
GLUCOSE BLDC GLUCOMTR-MCNC: 180 MG/DL — HIGH (ref 70–99)
GLUCOSE SERPL-MCNC: 161 MG/DL — HIGH (ref 70–99)
HCT VFR BLD CALC: 49.5 % — SIGNIFICANT CHANGE UP (ref 39–50)
HGB BLD-MCNC: 16.1 G/DL — SIGNIFICANT CHANGE UP (ref 13–17)
MCHC RBC-ENTMCNC: 29.3 PG — SIGNIFICANT CHANGE UP (ref 27–34)
MCHC RBC-ENTMCNC: 32.5 GM/DL — SIGNIFICANT CHANGE UP (ref 32–36)
MCV RBC AUTO: 90 FL — SIGNIFICANT CHANGE UP (ref 80–100)
PLATELET # BLD AUTO: 251 K/UL — SIGNIFICANT CHANGE UP (ref 150–400)
POTASSIUM SERPL-MCNC: 4.2 MMOL/L — SIGNIFICANT CHANGE UP (ref 3.5–5.3)
POTASSIUM SERPL-SCNC: 4.2 MMOL/L — SIGNIFICANT CHANGE UP (ref 3.5–5.3)
RBC # BLD: 5.5 M/UL — SIGNIFICANT CHANGE UP (ref 4.2–5.8)
RBC # FLD: 13.3 % — SIGNIFICANT CHANGE UP (ref 10.3–14.5)
SODIUM SERPL-SCNC: 140 MMOL/L — SIGNIFICANT CHANGE UP (ref 135–145)
WBC # BLD: 7.98 K/UL — SIGNIFICANT CHANGE UP (ref 3.8–10.5)
WBC # FLD AUTO: 7.98 K/UL — SIGNIFICANT CHANGE UP (ref 3.8–10.5)

## 2020-03-08 PROCEDURE — 99232 SBSQ HOSP IP/OBS MODERATE 35: CPT

## 2020-03-08 RX ORDER — FUROSEMIDE 40 MG
40 TABLET ORAL DAILY
Refills: 0 | Status: DISCONTINUED | OUTPATIENT
Start: 2020-03-08 | End: 2020-03-09

## 2020-03-08 RX ORDER — LISINOPRIL 2.5 MG/1
20 TABLET ORAL DAILY
Refills: 0 | Status: DISCONTINUED | OUTPATIENT
Start: 2020-03-09 | End: 2020-03-09

## 2020-03-08 RX ORDER — AMLODIPINE BESYLATE 2.5 MG/1
5 TABLET ORAL DAILY
Refills: 0 | Status: DISCONTINUED | OUTPATIENT
Start: 2020-03-08 | End: 2020-03-09

## 2020-03-08 RX ADMIN — Medication 2: at 13:04

## 2020-03-08 RX ADMIN — Medication 50 MILLIGRAM(S): at 22:47

## 2020-03-08 RX ADMIN — Medication 40 MILLIGRAM(S): at 06:38

## 2020-03-08 RX ADMIN — Medication 4 UNIT(S): at 08:47

## 2020-03-08 RX ADMIN — Medication 50 MILLIGRAM(S): at 00:11

## 2020-03-08 RX ADMIN — LISINOPRIL 10 MILLIGRAM(S): 2.5 TABLET ORAL at 06:37

## 2020-03-08 RX ADMIN — Medication 0.1 MILLIGRAM(S): at 06:37

## 2020-03-08 RX ADMIN — Medication 81 MILLIGRAM(S): at 14:39

## 2020-03-08 RX ADMIN — Medication 50 MILLIGRAM(S): at 06:37

## 2020-03-08 RX ADMIN — APIXABAN 5 MILLIGRAM(S): 2.5 TABLET, FILM COATED ORAL at 06:37

## 2020-03-08 RX ADMIN — Medication 2: at 08:47

## 2020-03-08 RX ADMIN — Medication 50 MILLIGRAM(S): at 11:57

## 2020-03-08 RX ADMIN — Medication 4 UNIT(S): at 17:21

## 2020-03-08 RX ADMIN — APIXABAN 5 MILLIGRAM(S): 2.5 TABLET, FILM COATED ORAL at 17:21

## 2020-03-08 RX ADMIN — Medication 50 MILLIGRAM(S): at 17:21

## 2020-03-08 RX ADMIN — Medication 4 UNIT(S): at 13:04

## 2020-03-08 NOTE — PROGRESS NOTE ADULT - SUBJECTIVE AND OBJECTIVE BOX
[facility]  [address], [place] [address]    REASON FOR VISIT:  Follow up on atrial fib  UPDATE: no complaints  TELEMETRY MONITORING:  atrial fib better controlled  03-08    140  |  99  |  27.0<H>  ----------------------------<  161<H>  4.2   |  31.0<H>  |  1.46<H>    Ca    9.5      08 Mar 2020 06:12  Mg     2.4     03-07    MEDICATIONS  (STANDING):  ALBUTerol    90 MICROgram(s) HFA Inhaler 1 Puff(s) Inhalation every 4 hours  apixaban 5 milliGRAM(s) Oral every 12 hours  aspirin enteric coated 81 milliGRAM(s) Oral daily  cloNIDine 0.1 milliGRAM(s) Oral daily  furosemide   Injectable 40 milliGRAM(s) IV Push daily  influenza   Vaccine 0.5 milliLiter(s) IntraMuscular once  insulin glargine Injectable (LANTUS) 12 Unit(s) SubCutaneous at bedtime  insulin lispro (HumaLOG) corrective regimen sliding scale   SubCutaneous three times a day before meals  insulin lispro (HumaLOG) corrective regimen sliding scale   SubCutaneous at bedtime  insulin lispro Injectable (HumaLOG) 4 Unit(s) SubCutaneous three times a day before meals  lisinopril 10 milliGRAM(s) Oral daily  metoprolol tartrate 50 milliGRAM(s) Oral every 6 hours  tiotropium 18 MICROgram(s) Capsule 1 Capsule(s) Inhalation daily    ROS:  No fever chills  Cardiac  No cp sob or palp  Resp  no cough no mucus production  Gi  no abd pain no melana  Ext No calf tenderness, no edema    Vital Signs Last 24 Hrs  T(C): 36.7 (08 Mar 2020 06:41), Max: 36.8 (07 Mar 2020 09:35)  T(F): 98.1 (08 Mar 2020 06:41), Max: 98.2 (07 Mar 2020 09:35)  HR: 82 (08 Mar 2020 06:41) (82 - 133)  BP: 156/96 (08 Mar 2020 06:41) (122/77 - 156/96)  BP(mean): --  RR: 18 (08 Mar 2020 06:41) (18 - 20)  SpO2: 98% (08 Mar 2020 06:41) (95% - 100%)  T(C): 36.7 (03-08-20 @ 06:41), Max: 36.8 (03-07-20 @ 09:35)  HR: 82 (03-08-20 @ 06:41) (82 - 133)  BP: 156/96 (03-08-20 @ 06:41) (122/77 - 156/96)  RR: 18 (03-08-20 @ 06:41) (18 - 20)  SpO2: 98% (03-08-20 @ 06:41) (95% - 100%)    HEENT Head atraumatic eomi, oral mucosa moist  CV S1&S2  irregular  RESP  cta  GI  Soft active bowel sounds non tender  EXT  No clubbing/Cyanosis / No edema  NEURO  Alert oriented  No gross motor or sensory deficits

## 2020-03-08 NOTE — PROGRESS NOTE ADULT - ASSESSMENT
This is a 60 y/o male with PMH of htn, HLD who presented with acute heart failure, EF on echo 45% Cath showed non obstructive cAD     Afib RVR new onset  conrad vasc 2  d/c coreg and start metoprolol for rate control  c/w eilquis  NPO after 12 am for Cardioversion    Decompensated heart failure/mildy reduced ef 45%  beta blocker -> lopresser 50 q6h  change to po lasix  bmp pending from today if creat ok will add arb This is a 58 y/o male with PMH of htn, HLD who presented with acute heart failure, EF on echo 45% Cath showed non obstructive cAD     Afib RVR new onset  conrad vasc 2  d/c coreg and start metoprolol for rate control  c/w eilquis  NPO after 12 am for Cardioversion    Decompensated heart failure/mildy reduced ef 45%  beta blocker -> lopresser 50 q6h  change to po lasix  bmp pending from today will increase lisinopril This is a 60 y/o male with PMH of htn, HLD who presented with acute heart failure, EF on echo 45% Cath showed non obstructive cAD     Afib RVR new onset  conrad vasc 2  c/w eilquis  NPO after 12 am for Cardioversion    Decompensated heart failure/mildy reduced ef 45%  beta blocker -> lopresser 50 q6h  change to po lasix  bmp pending from today will increase lisinopril

## 2020-03-08 NOTE — PROGRESS NOTE ADULT - ASSESSMENT
59 year old male with PMH HTN, Dyslipidemia, T2DM and JADON presented with cough and wheezing and initially placed on BiPAP in ER. He was admitted for further management and evaluation.   Chest X-Ray showed Cardiomegaly, Troponin <0.02 x 2,  and Flu A, B RSV negative.    CHFrEF. Appears to have improved since admisssion. TTE with EF 40-45%, mild to moderate MR and moderate AR. Underwent cardiac catheterization on 3/4 which showed non obstructive CAD. Also noted to have AF with RVR.    Appears to be euvolemic now, still with paroxysmal AF  - Continue Lasix, changed to PO. Monitor SCr  - Continue BB, ACEI  - Cardiology follow up  ?    AF with RVR. CHADS 2  - Continue BB, Eliquis  - Plan for DCCV if remains in AF    DMT2 - almost normoglycemic on current regimen. A1c 8.6  - Continue BGM with SSI  - Continue Lantus 12U and Humalog 4U    HTN - not controlled on current regimen  - Continue BB, - Increase Lisinopril to 20mg    Dyslipidemia - stable on diet. LDL 49  - Continue Statin    Prophylactic measure  - VTEp on DOAC    Disposition - Pending clinical course, Anticipate home with no needs

## 2020-03-08 NOTE — PROGRESS NOTE ADULT - SUBJECTIVE AND OBJECTIVE BOX
HOSPITALIST PROGRESS NOTE    GAL COREAS  [id number]  59yMale    Patient is a 59y old  Male who presents with a chief complaint of cough/ wheeze (08 Mar 2020 09:16)      SUBJECTIVE:   Chart reviewed since last visit.  Patient seen and examined at bedside for CHF, AF  Denies any dyspnea, chest pain, palpitations or cough  Mild dizziness upon exertion      OBJECTIVE:  Vital Signs Last 24 Hrs  T(C): 36.7 (08 Mar 2020 11:53), Max: 36.7 (07 Mar 2020 21:56)  T(F): 98 (08 Mar 2020 11:53), Max: 98.1 (07 Mar 2020 21:56)  HR: 82 (08 Mar 2020 11:53) (82 - 111)  BP: 150/94 (08 Mar 2020 11:53) (122/77 - 156/96)   RR: 14 (08 Mar 2020 11:53) (14 - 18)  SpO2: 96% (08 Mar 2020 11:53) (95% - 98%)    PHYSICAL EXAMINATION  General: Sitting in chair, NAD  HEENT:  EOMI  NECK:  No thyromegaly  CVS: regular rate and rhythm S1 S2  RESP:  CTAB  GI:  Soft nondistended nontender BS+  : No suprapubic tenderness  MSK:  FROM, edema -  CNS:  No gross focal or global deficit noted  INTEG:  warm dry skin  PSYCH:  Fair mood        MONITOR: alternating NSR with AF  CAPILLARY BLOOD GLUCOSE      POCT Blood Glucose.: 152 mg/dL (08 Mar 2020 12:08)  POCT Blood Glucose.: 180 mg/dL (08 Mar 2020 08:20)  POCT Blood Glucose.: 161 mg/dL (07 Mar 2020 21:55)  POCT Blood Glucose.: 146 mg/dL (07 Mar 2020 17:23)        I&O's Summary    07 Mar 2020 06:01  -  08 Mar 2020 07:00  --------------------------------------------------------  IN: 740 mL / OUT: 2750 mL / NET: -2010 mL    08 Mar 2020 07:01  -  08 Mar 2020 15:03  --------------------------------------------------------  IN: 440 mL / OUT: 700 mL / NET: -260 mL                            16.1   7.98  )-----------( 251      ( 08 Mar 2020 09:31 )             49.5       03-08    140  |  99  |  27.0<H>  ----------------------------<  161<H>  4.2   |  31.0<H>  |  1.46<H>    Ca    9.5      08 Mar 2020 06:12  Mg     2.4     03-07              Culture:    TTE:    RADIOLOGY        MEDICATIONS  (STANDING):  ALBUTerol    90 MICROgram(s) HFA Inhaler 1 Puff(s) Inhalation every 4 hours  apixaban 5 milliGRAM(s) Oral every 12 hours  aspirin enteric coated 81 milliGRAM(s) Oral daily  cloNIDine 0.1 milliGRAM(s) Oral daily  dextrose 5%. 1000 milliLiter(s) (50 mL/Hr) IV Continuous <Continuous>  dextrose 50% Injectable 12.5 Gram(s) IV Push once  dextrose 50% Injectable 25 Gram(s) IV Push once  dextrose 50% Injectable 25 Gram(s) IV Push once  furosemide    Tablet 40 milliGRAM(s) Oral daily  influenza   Vaccine 0.5 milliLiter(s) IntraMuscular once  insulin glargine Injectable (LANTUS) 12 Unit(s) SubCutaneous at bedtime  insulin lispro (HumaLOG) corrective regimen sliding scale   SubCutaneous three times a day before meals  insulin lispro (HumaLOG) corrective regimen sliding scale   SubCutaneous at bedtime  insulin lispro Injectable (HumaLOG) 4 Unit(s) SubCutaneous three times a day before meals  lisinopril 10 milliGRAM(s) Oral daily  metoprolol tartrate 50 milliGRAM(s) Oral every 6 hours  tiotropium 18 MICROgram(s) Capsule 1 Capsule(s) Inhalation daily      MEDICATIONS  (PRN):  ALBUTerol    0.083% 2.5 milliGRAM(s) Nebulizer every 2 hours PRN Shortness of Breath and/or Wheezing  dextrose 40% Gel 15 Gram(s) Oral once PRN Blood Glucose LESS THAN 70 milliGRAM(s)/deciliter  glucagon  Injectable 1 milliGRAM(s) IntraMuscular once PRN Glucose LESS THAN 70 milligrams/deciliter  guaiFENesin   Syrup  (Sugar-Free) 200 milliGRAM(s) Oral every 6 hours PRN Cough  metoprolol tartrate Injectable 5 milliGRAM(s) IV Push every 6 hours PRN Sustained HR > 130

## 2020-03-09 ENCOUNTER — TRANSCRIPTION ENCOUNTER (OUTPATIENT)
Age: 60
End: 2020-03-09

## 2020-03-09 LAB
ANION GAP SERPL CALC-SCNC: 12 MMOL/L — SIGNIFICANT CHANGE UP (ref 5–17)
BUN SERPL-MCNC: 27 MG/DL — HIGH (ref 8–20)
CALCIUM SERPL-MCNC: 9.2 MG/DL — SIGNIFICANT CHANGE UP (ref 8.6–10.2)
CHLORIDE SERPL-SCNC: 94 MMOL/L — LOW (ref 98–107)
CO2 SERPL-SCNC: 29 MMOL/L — SIGNIFICANT CHANGE UP (ref 22–29)
CREAT SERPL-MCNC: 1.3 MG/DL — SIGNIFICANT CHANGE UP (ref 0.5–1.3)
GLUCOSE BLDC GLUCOMTR-MCNC: 166 MG/DL — HIGH (ref 70–99)
GLUCOSE BLDC GLUCOMTR-MCNC: 242 MG/DL — HIGH (ref 70–99)
GLUCOSE SERPL-MCNC: 162 MG/DL — HIGH (ref 70–99)
POTASSIUM SERPL-MCNC: 3.7 MMOL/L — SIGNIFICANT CHANGE UP (ref 3.5–5.3)
POTASSIUM SERPL-SCNC: 3.7 MMOL/L — SIGNIFICANT CHANGE UP (ref 3.5–5.3)
SODIUM SERPL-SCNC: 135 MMOL/L — SIGNIFICANT CHANGE UP (ref 135–145)

## 2020-03-09 PROCEDURE — 99232 SBSQ HOSP IP/OBS MODERATE 35: CPT

## 2020-03-09 PROCEDURE — 94760 N-INVAS EAR/PLS OXIMETRY 1: CPT

## 2020-03-09 PROCEDURE — C1887: CPT

## 2020-03-09 PROCEDURE — C1769: CPT

## 2020-03-09 PROCEDURE — 80048 BASIC METABOLIC PNL TOTAL CA: CPT

## 2020-03-09 PROCEDURE — 80053 COMPREHEN METABOLIC PANEL: CPT

## 2020-03-09 PROCEDURE — 85610 PROTHROMBIN TIME: CPT

## 2020-03-09 PROCEDURE — 93306 TTE W/DOPPLER COMPLETE: CPT

## 2020-03-09 PROCEDURE — 82962 GLUCOSE BLOOD TEST: CPT

## 2020-03-09 PROCEDURE — 71045 X-RAY EXAM CHEST 1 VIEW: CPT

## 2020-03-09 PROCEDURE — 99153 MOD SED SAME PHYS/QHP EA: CPT

## 2020-03-09 PROCEDURE — 87631 RESP VIRUS 3-5 TARGETS: CPT

## 2020-03-09 PROCEDURE — 85027 COMPLETE CBC AUTOMATED: CPT

## 2020-03-09 PROCEDURE — 81001 URINALYSIS AUTO W/SCOPE: CPT

## 2020-03-09 PROCEDURE — 85730 THROMBOPLASTIN TIME PARTIAL: CPT

## 2020-03-09 PROCEDURE — 84484 ASSAY OF TROPONIN QUANT: CPT

## 2020-03-09 PROCEDURE — 80061 LIPID PANEL: CPT

## 2020-03-09 PROCEDURE — 94640 AIRWAY INHALATION TREATMENT: CPT

## 2020-03-09 PROCEDURE — 83880 ASSAY OF NATRIURETIC PEPTIDE: CPT

## 2020-03-09 PROCEDURE — C1894: CPT

## 2020-03-09 PROCEDURE — 83036 HEMOGLOBIN GLYCOSYLATED A1C: CPT

## 2020-03-09 PROCEDURE — 96374 THER/PROPH/DIAG INJ IV PUSH: CPT

## 2020-03-09 PROCEDURE — 80074 ACUTE HEPATITIS PANEL: CPT

## 2020-03-09 PROCEDURE — 93458 L HRT ARTERY/VENTRICLE ANGIO: CPT

## 2020-03-09 PROCEDURE — 99291 CRITICAL CARE FIRST HOUR: CPT | Mod: 25

## 2020-03-09 PROCEDURE — 99152 MOD SED SAME PHYS/QHP 5/>YRS: CPT

## 2020-03-09 PROCEDURE — 93010 ELECTROCARDIOGRAM REPORT: CPT

## 2020-03-09 PROCEDURE — 94660 CPAP INITIATION&MGMT: CPT

## 2020-03-09 PROCEDURE — 83735 ASSAY OF MAGNESIUM: CPT

## 2020-03-09 PROCEDURE — 93005 ELECTROCARDIOGRAM TRACING: CPT

## 2020-03-09 PROCEDURE — 36415 COLL VENOUS BLD VENIPUNCTURE: CPT

## 2020-03-09 PROCEDURE — 99239 HOSP IP/OBS DSCHRG MGMT >30: CPT

## 2020-03-09 RX ORDER — METFORMIN HYDROCHLORIDE 850 MG/1
0 TABLET ORAL
Qty: 0 | Refills: 0 | DISCHARGE

## 2020-03-09 RX ORDER — LISINOPRIL 2.5 MG/1
0 TABLET ORAL
Qty: 0 | Refills: 0 | DISCHARGE

## 2020-03-09 RX ORDER — METOPROLOL TARTRATE 50 MG
1 TABLET ORAL
Qty: 0 | Refills: 0 | DISCHARGE
Start: 2020-03-09

## 2020-03-09 RX ORDER — APIXABAN 2.5 MG/1
1 TABLET, FILM COATED ORAL
Qty: 60 | Refills: 0
Start: 2020-03-09 | End: 2020-04-07

## 2020-03-09 RX ORDER — ASPIRIN/CALCIUM CARB/MAGNESIUM 324 MG
1 TABLET ORAL
Qty: 0 | Refills: 0 | DISCHARGE
Start: 2020-03-09

## 2020-03-09 RX ORDER — DULAGLUTIDE 4.5 MG/.5ML
0 INJECTION, SOLUTION SUBCUTANEOUS
Qty: 0 | Refills: 0 | DISCHARGE

## 2020-03-09 RX ORDER — AMLODIPINE BESYLATE 2.5 MG/1
1 TABLET ORAL
Qty: 30 | Refills: 0
Start: 2020-03-09 | End: 2020-04-07

## 2020-03-09 RX ORDER — FUROSEMIDE 40 MG
0 TABLET ORAL
Qty: 0 | Refills: 0 | DISCHARGE

## 2020-03-09 RX ORDER — LISINOPRIL 2.5 MG/1
1 TABLET ORAL
Qty: 30 | Refills: 0
Start: 2020-03-09 | End: 2020-04-07

## 2020-03-09 RX ORDER — METFORMIN HYDROCHLORIDE 850 MG/1
1 TABLET ORAL
Qty: 0 | Refills: 0 | DISCHARGE

## 2020-03-09 RX ORDER — FUROSEMIDE 40 MG
1 TABLET ORAL
Qty: 30 | Refills: 0
Start: 2020-03-09 | End: 2020-04-07

## 2020-03-09 RX ADMIN — Medication 50 MILLIGRAM(S): at 12:15

## 2020-03-09 RX ADMIN — Medication 50 MILLIGRAM(S): at 05:06

## 2020-03-09 RX ADMIN — Medication 81 MILLIGRAM(S): at 12:15

## 2020-03-09 RX ADMIN — AMLODIPINE BESYLATE 5 MILLIGRAM(S): 2.5 TABLET ORAL at 05:06

## 2020-03-09 RX ADMIN — Medication 4 UNIT(S): at 12:16

## 2020-03-09 RX ADMIN — Medication 40 MILLIGRAM(S): at 05:06

## 2020-03-09 RX ADMIN — LISINOPRIL 20 MILLIGRAM(S): 2.5 TABLET ORAL at 09:29

## 2020-03-09 RX ADMIN — Medication 0.1 MILLIGRAM(S): at 05:07

## 2020-03-09 RX ADMIN — APIXABAN 5 MILLIGRAM(S): 2.5 TABLET, FILM COATED ORAL at 05:07

## 2020-03-09 RX ADMIN — Medication 4: at 12:16

## 2020-03-09 NOTE — DISCHARGE NOTE PROVIDER - CARE PROVIDER_API CALL
Yadiel Grider)  Cardiovascular Disease  [address], [address]  [place] [address]  Phone: [phone]  Fax: [phone]  Follow Up Time:     Tayla Cobos  Phone: (   )    -  Fax: (   )    -  Follow Up Time:

## 2020-03-09 NOTE — PROGRESS NOTE ADULT - ATTENDING COMMENTS
58 y/o male presented with acute heart failure, EF on echo 45%   Cath showed non obstructive cAD   on BB , ACEI, ASA   Diuresis with IV lasix   Developed new onset AFib today and received oral BB and will start metoprolol IV PRN  start eliquis 5 mg twice daily (CHADSVASC 2 )
60 y/o male with history of HTN, DM and JADON on CPAP   presented with acute heart failure , EF 45%   diuresed   Developed paroxysmal AFib while in hospital   back in NSR   Cath showed non obstructive CAD  continue ASA , statins, BB, ACEI   Follow up as outpatient in 3-4 weeks
Pt see and examined. He has mild LV dysfunction which could be from Afib with RVR  Changed to metoprolol, increase to 50 mg q6h  cont with AC  If rate not controlled by tomorrow, plan for AMANDA and DCC on monday.
pending [facility] now   Diuresis with 40 mg IV lasix   started BB and ARB.
pt's Hr is better controlled. Remains in afib.  cont with AC.  Plan for DCC in AM.  Pt is npo post midnight.  I agree with a.p.

## 2020-03-09 NOTE — PROGRESS NOTE ADULT - ASSESSMENT
59 year old male with PMH HTN, Dyslipidemia, T2DM and JADON presented with cough and wheezing and initially placed on BiPAP in ER. He was admitted for further management and evaluation.   Chest X-Ray showed Cardiomegaly, Troponin <0.02 x 2,  and Flu A, B RSV negative.    CHFrEF. Appears to have improved since admisssion. TTE with EF 40-45%, mild to moderate MR and moderate AR. Underwent cardiac catheterization on 3/4 which showed non obstructive CAD. Also noted to have AF with RVR.    Appears to be euvolemic now. SCr stable  - Continue Lasix, changed to PO. Monitor SCr  - Continue BB, ACEI  - Cardiology follow up      AF with RVR. CHADS 2. Reverted to SR  - Continue BB, Eliquis  - Plan for DCCV if remains in AF    DMT2 - almost normoglycemic on current regimen. A1c 8.6  - Continue BGM with SSI  - Continue Lantus 12U and Humalog 4U    HTN - not controlled, improved since yesterday  - Continue BB, - Increased Lisinopril to 20mg    Dyslipidemia - stable on diet. LDL 49  - Continue Statin    Prophylactic measure  - VTEp on DOAC    Disposition - Pending clinical course, Anticipate home with no needs

## 2020-03-09 NOTE — DIETITIAN INITIAL EVALUATION ADULT. - PERTINENT LABORATORY DATA
03-09 Na135 mmol/L Glu 162 mg/dL<H> K+ 3.7 mmol/L Cr  1.30 mg/dL BUN 27.0 mg/dL<H> Phos n/a   Alb n/a   PAB n/a

## 2020-03-09 NOTE — PROGRESS NOTE ADULT - ASSESSMENT
This is a 58 y/o male with PMH of htn, HLD who presented with acute heart failure, EF on echo 45% Cath showed non obstructive CAD. Patient also w/ n/o AFIB which spontaneously converted to NSR.     Nonobstructive CAD:   -s/p LHC with no cardiac intervention  -Continue ASA/Lisinopril/metoprolol   -CP/SOB resolved/ambulating OOB/Joe. diet  -Diet/lifestyle modifications and medication compliance heavily reinforced   -stable for DC from cardiology standpoint  - follow up outpatient with Dr. Bonilla/nurse practitioner in cardiology office within 4 weeks.     Acute hypoxic respiratory failure 2/2 chronic systolic CHF:   -Euvolemic on exam  -Lungs clear/OOB without oxygen tolerating well.   - continue ASA, lisinopril, lasix, metoprolol     Afib RVR   - new onset Afib RVR   - Now NSR confirmed on EKG  - Chadsvasc 2   - continue eliquis and coreg

## 2020-03-09 NOTE — DISCHARGE NOTE PROVIDER - NSDCMRMEDTOKEN_GEN_ALL_CORE_FT
amLODIPine 5 mg oral tablet: 1 tab(s) orally once a day  apixaban 5 mg oral tablet: 1 tab(s) orally every 12 hours  aspirin 81 mg oral delayed release tablet: 1 tab(s) orally once a day  cloNIDine 0.1 mg oral tablet: 1 tab(s) orally once a day  furosemide 40 mg oral tablet: 1 tab(s) orally once a day  lisinopril 20 mg oral tablet: 1 tab(s) orally once a day   metFORMIN 500 mg oral tablet: 1 tab(s) orally 2 times a day  Metoprolol Succinate ER 50 mg oral tablet, extended release: 1 tab(s) orally once a day  Trulicity Pen: Resume home dose

## 2020-03-09 NOTE — PROGRESS NOTE ADULT - SUBJECTIVE AND OBJECTIVE BOX
[place] [facility]                          [address] [place] [address]                       Phone: [phone]. Fax:[phone]                      ________________________________________________           Reason for follow up/Overnight events: converted to NSR     HPI:  58 y/o male with h/o htn, dm and sleep apnea and on cpap of 14 as per pt. came to the ER as he started having dry cough sob with cough ,  wheeze in the chest which he experienced for short period yesterday, got better on its own but all symptoms came back this morning and symptoms were way more that yesterday so he decided to come to the hospital. no sick contact. no recent travel. no cp. no fever. no abd. pain. no n/v/d. Upon arival in the ER pt. was put on bipap, iv solumedrol ( was wheezing as per ER physician dr. De Luna ) , iv lasix and at the time of admission pt. feels better, came off the bipap and on o2 via NC and talking in full sentences. (02 Mar 2020 18:26)      ROS: All review of systems negative unless indicated otherwise below.                          LAB RESULTS                   COMPLETE BLOOD COUNT( 08 Mar 2020 09:31 )                            16.1 g/dL  7.98 K/uL )---------------( 251 K/uL                        49.5 %      Automated Differential     Auto Basophil # - X      Auto Basophil % - X      Auto Eosinophil # - X      Auto Eosinophil % - X      Auto Immature Granulocyte # - X      Auto Immature Granulocyte % - X      Auto Lymphocyte # - X      Auto Lymphocyte % - X      Auto Monocyte # - X      Auto Monocyte % - X      Auto Neutrophil # - X      Auto Neutrophil % - X                                      CHEMISTRY                 Basic Metabolic Panel (20 @ 06:02)    135  |  94<L>  |  27.0<H>  ----------------------------<  162<H>  3.7   |  29.0  |  1.30    Ca    9.2      09 Mar 2020 06:02  Mg     2.4     03-07                    Liver Functions (20 @ 11:19))  TPro  7.4  /  Alb  4.2  /  TBili  1.4  /  DBili  x   /  AST  25  /  ALT  66  /  AlkPhos  69     PT/INR/PTT ( 02 Mar 2020 19:34 )                        :                       :      12.3         :       26.4                  .        .                   .              .           .       1.09        .                                                                   Cardiac Enzymes   ( 03 Mar 2020 07:23 )  Troponin T  <0.01,  CPK  X    , CKMB  X    , BNP X        , ( 02 Mar 2020 16:26 )  Troponin T  <0.01,  CPK  X    , CKMB  X    ,                               CARDIOLOGY RESULTS: Official Report/Preliminary Verbal Reports    ECHO:   < from: TTE Echo Complete w/o contrast w/ Doppler (20 @ 09:39) >    Summary:   1. Endocardial visualization was enhanced with intravenous echo contrast.   2. Left ventricular ejection fraction, by visual estimation, is 40 to 45%.   3. Moderately decreased global left ventricular systolic function.   4. The left ventricular diastolic function could not be assessed in this study.   5. Normal left ventricular internal cavity size.   6. There is mild concentric left ventricular hypertrophy.   7. Severely enlarged left atrium.   8. Mildly enlarged right atrium.   9. Mild mitral annular calcification.  10. Mild thickening of the anterior and posterior mitral valve leaflets.  11. Mild to moderate mitral valve regurgitation.  12. Sclerotic aortic valve with normal opening.  13. Moderate aortic regurgitation.  14. There is no evidence of pericardial effusion.    MD Simeon Electronically signed on 3/3/2020 at 11:13:06 AM    < end of copied text >    CATH:   < from: Cardiac Cath Lab - Adult (20 @ 17:53) >  VENTRICLES: Global left ventricular function was moderately depressed. EF  calculated bycontrast ventriculography was 45 %.  VALVES: AORTIC VALVE: No significant aortic valve gradient. MITRAL VALVE:  The mitral valve exhibited mild regurgitation.  CORONARY VESSELS: The coronary circulation is right dominant.  LM:   --  LM: Normal. The vessel was normal sized, not calcified, and not  tortuous. Angiography showed no evidence of disease.  LAD:   --  LAD: Normal. The vessel was normal sized, not calcified, and not  tortuous. Angiography showed mild atherosclerosis with no flow limiting  lesions.  CX:   --  Circumflex: Normal. The vessel was large sized, not calcified,  and not tortuous. Angiography showed minor luminal irregularities with no  flow limiting lesions.  RCA:   --  RCA: Normal. The vessel was normal sized, not calcified,and  moderately tortuous. Angiography showed mild atherosclerosis with no flow  limiting lesions.  COMPLICATIONS: There were no complications. No complications occurred  during the cath lab visit.  DIAGNOSTIC IMPRESSIONS: There is mild irregularity of the coronary anatomy.  Left ventricular function is abnormal.  LVEF=45%  Severe Diastolic Dysfunction (LVEDP=34 mmHg)  DIAGNOSTIC RECOMMENDATIONS: The patient should continue with the present  medications. The patient's diuretic regimen should be carefully increased.  Patient management should include aggressive medical therapy, close  monitoring of BUN and creatinine, resumption of all previous activities in  2 days, a cardiac rehabilitation program, and weight reduction. The  patient should follow a low fat and low calorie diet. Medical management  is recommended.  Prepared and signed by  Jay Jay San MD    < end of copied text >                          CARDIOLOGY REVIEW: Personally visualized and reviewed  Telemetry Last 24h: SR PACs 60-50                              DAILY WEIGHTS - 48 HOUR TREND     Daily Weight in k.4 (09 Mar 2020 10:30), Weight in k.3 (09 Mar 2020 04:30)                             INTAKE AND OUTPUT - 48 HOUR TREND     20 @ 06:01  -  20 @ 07:00  --------------------------------------------------------  IN:  Total IN: 0 mL    OUT:    Voided: 2750 mL  Total OUT: 2750 mL    Total NET: -2750 mL      20 @ 07:01  -  20 @ 07:00  --------------------------------------------------------  IN:  Total IN: 0 mL    OUT:    Voided: 1900 mL  Total OUT: 1900 mL    Total NET: -1900 mL          HOME MEDICATIONS:  aspirin 81 mg oral delayed release tablet: 1 tab(s) orally once a day (09 Mar 2020 11:20)  cloNIDine 0.1 mg oral tablet: 1 tab(s) orally once a day (09 Mar 2020 11:20)  metFORMIN 500 mg oral tablet: 1 tab(s) orally 2 times a day (09 Mar 2020 11:20)  Metoprolol Succinate ER 50 mg oral tablet, extended release: 1 tab(s) orally once a day (09 Mar 2020 11:20)  Trulicity Pen: Resume home dose (09 Mar 2020 11:20)                             Current Admission Active Medications    ALBUTerol    0.083% 2.5 milliGRAM(s) Nebulizer every 2 hours PRN Shortness of Breath and/or Wheezing  ALBUTerol    90 MICROgram(s) HFA Inhaler 1 Puff(s) Inhalation every 4 hours  amLODIPine   Tablet 5 milliGRAM(s) Oral daily  apixaban 5 milliGRAM(s) Oral every 12 hours  aspirin enteric coated 81 milliGRAM(s) Oral daily  cloNIDine 0.1 milliGRAM(s) Oral daily  dextrose 40% Gel 15 Gram(s) Oral once PRN Blood Glucose LESS THAN 70 milliGRAM(s)/deciliter  dextrose 5%. 1000 milliLiter(s) (50 mL/Hr) IV Continuous <Continuous>  dextrose 50% Injectable 12.5 Gram(s) IV Push once  dextrose 50% Injectable 25 Gram(s) IV Push once  dextrose 50% Injectable 25 Gram(s) IV Push once  furosemide    Tablet 40 milliGRAM(s) Oral daily  glucagon  Injectable 1 milliGRAM(s) IntraMuscular once PRN Glucose LESS THAN 70 milligrams/deciliter  guaiFENesin   Syrup  (Sugar-Free) 200 milliGRAM(s) Oral every 6 hours PRN Cough  influenza   Vaccine 0.5 milliLiter(s) IntraMuscular once  insulin glargine Injectable (LANTUS) 12 Unit(s) SubCutaneous at bedtime  insulin lispro (HumaLOG) corrective regimen sliding scale   SubCutaneous three times a day before meals  insulin lispro (HumaLOG) corrective regimen sliding scale   SubCutaneous at bedtime  insulin lispro Injectable (HumaLOG) 4 Unit(s) SubCutaneous three times a day before meals  lisinopril 20 milliGRAM(s) Oral daily  metoprolol tartrate 50 milliGRAM(s) Oral every 6 hours  metoprolol tartrate Injectable 5 milliGRAM(s) IV Push every 6 hours PRN Sustained HR > 130  tiotropium 18 MICROgram(s) Capsule 1 Capsule(s) Inhalation daily                        PHYSICAL EXAM:    Vital Signs Last 24 Hrs  T(C): 36.9 (09 Mar 2020 09:56), Max: 36.9 (09 Mar 2020 09:56)  T(F): 98.4 (09 Mar 2020 09:56), Max: 98.4 (09 Mar 2020 09:56)  HR: 71 (09 Mar 2020 12:00) (64 - 80)  BP: 136/84 (09 Mar 2020 12:00) (125/79 - 144/72)  BP(mean): --  RR: 18 (09 Mar 2020 05:02) (18 - 18)  SpO2: 94% (09 Mar 2020 09:56) (94% - 98%)    GENERAL: NAD  NECK: Supple, No JVD  NERVOUS SYSTEM:  Alert & Oriented X3, non focal neuro exam.   CHEST/LUNG: clear lungs, No rales, rhonchi, wheezing, or rubs  HEART: Regular rate and rhythm; s1 and s2 auscultated, No murmurs, rubs, or gallops  ABDOMEN: Soft, Nontender, Nondistended; Bowel sounds present and normoactive.   EXTREMITIES:  2+ Peripheral Pulses, No clubbing, cyanosis, or edema

## 2020-03-09 NOTE — DISCHARGE NOTE PROVIDER - HOSPITAL COURSE
59 year old male with PMH HTN, Dyslipidemia, T2DM and JADON presented with cough and wheezing and initially placed on BiPAP in ER. He was admitted for further management and evaluation.  Chest X-Ray showed Cardiomegaly, Troponin <0.02 x 2,  and Flu A, B RSV negative.        He was admitted for CHFrEF and treated with diuresis and improved since admisssion. TTE with EF 40-45%, mild to moderate MR and moderate AR. Underwent cardiac catheterization on 3/4 which showed non obstructive CAD. Also noted to have AF with RVR which was treated with BB and Eliquis. His BP was elevated and Lisinopril increased to 20mg        Patient appears to be euvolemic now and has reverted to NSR. He did have a mild bump in his SCr with diuresis which has also normalized.        He was placed on Insulins for his T2DM. A1c 8.6.  LDL 49, diet controlled.         Medically stable for discharge home 59 year old male with PMH HTN, Dyslipidemia, T2DM and JDAON presented with cough and wheezing and initially placed on BiPAP in ER. He was admitted for further management and evaluation.  Chest X-Ray showed Cardiomegaly, Troponin <0.02 x 2,  and Flu A, B RSV negative.        He was admitted for CHFrEF and treated with diuresis and improved since admisssion. TTE with EF 40-45%, mild to moderate MR and moderate AR. Underwent cardiac catheterization on 3/4 which showed non obstructive CAD. Also noted to have AF with RVR which was treated with BB and Eliquis. His BP was elevated and Lisinopril increased to 20mg        Patient appears to be euvolemic now and has reverted to NSR. He did have a mild bump in his SCr with diuresis which has also normalized.        He was placed on Insulins for his T2DM. A1c 8.6.  LDL 49, diet controlled.         Medically stable for discharge home.    Prescriptions sent to [facility]

## 2020-03-09 NOTE — PROGRESS NOTE ADULT - SUBJECTIVE AND OBJECTIVE BOX
HOSPITALIST PROGRESS NOTE    GAL COREAS  [id number]  59yMale    Patient is a 59y old  Male who presents with a chief complaint of cough/ wheeze (08 Mar 2020 15:03)      SUBJECTIVE:   Chart reviewed since last visit.  Patient seen and examined at bedside for CHF, AF  Denies any dyspnea, chest discomfort, dizziness or palpitations        OBJECTIVE:  Vital Signs Last 24 Hrs  T(C): 36.9 (09 Mar 2020 09:56), Max: 36.9 (09 Mar 2020 09:56)  T(F): 98.4 (09 Mar 2020 09:56), Max: 98.4 (09 Mar 2020 09:56)  HR: 80 (09 Mar 2020 09:56) (64 - 82)  BP: 134/84 (09 Mar 2020 09:56) (125/79 - 150/94)   RR: 18 (09 Mar 2020 05:02) (14 - 18)  SpO2: 94% (09 Mar 2020 09:56) (94% - 98%)    PHYSICAL EXAMINATION  General: Sitting in chair, NAD  HEENT:  EOMI  NECK:  No thyromegaly  CVS: regular rate and rhythm S1 S2  RESP:  CTAB  GI:  Soft nondistended nontender BS+  : No suprapubic tenderness  MSK:  FROM, edema -  CNS:  No gross focal or global deficit noted  INTEG:  warm dry skin  PSYCH:  Fair mood    MONITOR: SR  CAPILLARY BLOOD GLUCOSE      POCT Blood Glucose.: 166 mg/dL (09 Mar 2020 08:13)  POCT Blood Glucose.: 155 mg/dL (08 Mar 2020 21:58)  POCT Blood Glucose.: 115 mg/dL (08 Mar 2020 17:03)  POCT Blood Glucose.: 152 mg/dL (08 Mar 2020 12:08)        I&O's Summary    08 Mar 2020 07:01  -  09 Mar 2020 07:00  --------------------------------------------------------  IN: 560 mL / OUT: 1900 mL / NET: -1340 mL                            16.1   7.98  )-----------( 251      ( 08 Mar 2020 09:31 )             49.5       03-09    135  |  94<L>  |  27.0<H>  ----------------------------<  162<H>  3.7   |  29.0  |  1.30    Ca    9.2      09 Mar 2020 06:02              Culture:    TTE:    RADIOLOGY        MEDICATIONS  (STANDING):  ALBUTerol    90 MICROgram(s) HFA Inhaler 1 Puff(s) Inhalation every 4 hours  amLODIPine   Tablet 5 milliGRAM(s) Oral daily  apixaban 5 milliGRAM(s) Oral every 12 hours  aspirin enteric coated 81 milliGRAM(s) Oral daily  cloNIDine 0.1 milliGRAM(s) Oral daily  dextrose 5%. 1000 milliLiter(s) (50 mL/Hr) IV Continuous <Continuous>  dextrose 50% Injectable 12.5 Gram(s) IV Push once  dextrose 50% Injectable 25 Gram(s) IV Push once  dextrose 50% Injectable 25 Gram(s) IV Push once  furosemide    Tablet 40 milliGRAM(s) Oral daily  influenza   Vaccine 0.5 milliLiter(s) IntraMuscular once  insulin glargine Injectable (LANTUS) 12 Unit(s) SubCutaneous at bedtime  insulin lispro (HumaLOG) corrective regimen sliding scale   SubCutaneous three times a day before meals  insulin lispro (HumaLOG) corrective regimen sliding scale   SubCutaneous at bedtime  insulin lispro Injectable (HumaLOG) 4 Unit(s) SubCutaneous three times a day before meals  lisinopril 20 milliGRAM(s) Oral daily  metoprolol tartrate 50 milliGRAM(s) Oral every 6 hours  tiotropium 18 MICROgram(s) Capsule 1 Capsule(s) Inhalation daily      MEDICATIONS  (PRN):  ALBUTerol    0.083% 2.5 milliGRAM(s) Nebulizer every 2 hours PRN Shortness of Breath and/or Wheezing  dextrose 40% Gel 15 Gram(s) Oral once PRN Blood Glucose LESS THAN 70 milliGRAM(s)/deciliter  glucagon  Injectable 1 milliGRAM(s) IntraMuscular once PRN Glucose LESS THAN 70 milligrams/deciliter  guaiFENesin   Syrup  (Sugar-Free) 200 milliGRAM(s) Oral every 6 hours PRN Cough  metoprolol tartrate Injectable 5 milliGRAM(s) IV Push every 6 hours PRN Sustained HR > 130

## 2020-03-09 NOTE — PROGRESS NOTE ADULT - REASON FOR ADMISSION
cough/ wheeze
Cough/wheeze
cough/ wheeze

## 2020-03-09 NOTE — DISCHARGE NOTE PROVIDER - PROVIDER TOKENS
PROVIDER:[TOKEN:[[id number]]],FREE:[LAST:[Luizert],FIRST:[Tayla],PHONE:[(   )    -],FAX:[(   )    -]]

## 2020-03-09 NOTE — DISCHARGE NOTE PROVIDER - NSDCCPCAREPLAN_GEN_ALL_CORE_FT
PRINCIPAL DISCHARGE DIAGNOSIS  Diagnosis: Atrial fibrillation with RVR  Assessment and Plan of Treatment: Continue medications as prescribed.  Follow up with Cardiology      SECONDARY DISCHARGE DIAGNOSES  Diagnosis: Acute systolic HF (heart failure)  Assessment and Plan of Treatment: Continue diet and medications as prescribed.  Follow up with Cardiology    Diagnosis: Mild renal insufficiency  Assessment and Plan of Treatment: Resolved    Diagnosis: JADON (obstructive sleep apnea)  Assessment and Plan of Treatment: Continue home CPAP    Diagnosis: T2DM (type 2 diabetes mellitus)  Assessment and Plan of Treatment: Continue diet and home medications  Follow up with PMD    Diagnosis: HTN (hypertension)  Assessment and Plan of Treatment: Continue diet and medications as prescibed  Follow up with PMD

## 2020-03-09 NOTE — DISCHARGE NOTE NURSING/CASE MANAGEMENT/SOCIAL WORK - PATIENT PORTAL LINK FT
You can access the FollowMyHealth Patient Portal offered by [facility] by registering at the following website: [email or web address]. By joining [employer]’s FollowMyHealth portal, you will also be able to view your health information using other applications (apps) compatible with our system.

## 2020-03-09 NOTE — DIETITIAN INITIAL EVALUATION ADULT. - OTHER INFO
Pt with h/o HTN, Dyslipidemia, T2DM and JADON presented with cough and wheezing.  Pt reports good po intake at meals.  Pt states good po intake prior to admission and denies wt loss.  Pt states not following meal plan in the past.  Reviewed heart healthy, heart failure nutrition guidelines with pt and answered all questions.  Pt very receptive and verbalized understanding.  Nutrition literature provided.

## 2020-03-10 VITALS — WEIGHT: 199.08 LBS

## 2020-03-10 PROBLEM — E11.9 TYPE 2 DIABETES MELLITUS WITHOUT COMPLICATIONS: Chronic | Status: ACTIVE | Noted: 2020-03-02

## 2020-03-10 PROBLEM — I10 ESSENTIAL (PRIMARY) HYPERTENSION: Chronic | Status: ACTIVE | Noted: 2020-03-02

## 2020-03-10 PROBLEM — G47.33 OBSTRUCTIVE SLEEP APNEA (ADULT) (PEDIATRIC): Chronic | Status: ACTIVE | Noted: 2020-03-02

## 2020-03-10 PROBLEM — Z00.00 ENCOUNTER FOR PREVENTIVE HEALTH EXAMINATION: Status: ACTIVE | Noted: 2020-03-10

## 2020-03-16 NOTE — CDI QUERY NOTE - NSCDIOTHERTXTBX_GEN_ALL_CORE_HH
Documentation noted renal insufficiency    DOCUMENTATION:  Creatinine Trend:  Creatinine, Serum: 1.30 mg/dL [0.50 - 1.30] (03-09-20)  Creatinine, Serum: 1.46 mg/dL <H> [0.50 - 1.30] (03-08-20)  Creatinine, Serum: 1.31 mg/dL <H> [0.50 - 1.30] (03-07-20)  Creatinine, Serum: 1.10 mg/dL [0.50 - 1.30] (03-05-20)  Creatinine, Serum: 1.16 mg/dL [0.50 - 1.30] (03-04-20)  Creatinine, Serum: 1.14 mg/dL [0.50 - 1.30] (03-03-20)  Creatinine, Serum: 1.11 mg/dL [0.50 - 1.30] (03-02-20  [facility] policy based on KDIGO guidelines defines ZACH (applicable to both adult and pediatric patients) as any of the following;  •	Increase Creatinine = 0.3 mg/dl from baseline within 48 hours; or  •	Increase in Creatinine level to = 1.5x baseline, which is known or presumed to have occurred within the prior 7 days; or    Please clarify if the above clinical indicators support a diagnosis  A) ZACH  B) Other, please specify  C) Not clinically significant

## 2020-03-25 ENCOUNTER — APPOINTMENT (OUTPATIENT)
Dept: CARDIOLOGY | Facility: CLINIC | Age: 60
End: 2020-03-25

## 2020-04-21 ENCOUNTER — NON-APPOINTMENT (OUTPATIENT)
Age: 60
End: 2020-04-21

## 2020-06-25 ENCOUNTER — APPOINTMENT (OUTPATIENT)
Dept: CARDIOLOGY | Facility: CLINIC | Age: 60
End: 2020-06-25

## 2022-01-10 NOTE — DISCHARGE NOTE PROVIDER - NSDCADMDATE_GEN_ALL_CORE_FT
02-Mar-2020 17:30
59 y/o male with no pertinent PMHx presents via PD from [facility] after pt became aggressive towards staff. Pt claims to be acting tough so no one bothered him while he slept. Pt is now cooperative. Pt must be COVID swabbed before being transferred. Pt is requesting COVID vaccination. Pt has no other complaints at this time.

## 2024-05-30 ENCOUNTER — NON-APPOINTMENT (OUTPATIENT)
Age: 64
End: 2024-05-30

## 2025-05-04 ENCOUNTER — NON-APPOINTMENT (OUTPATIENT)
Age: 65
End: 2025-05-04